# Patient Record
Sex: FEMALE | Employment: OTHER | URBAN - NONMETROPOLITAN AREA
[De-identification: names, ages, dates, MRNs, and addresses within clinical notes are randomized per-mention and may not be internally consistent; named-entity substitution may affect disease eponyms.]

---

## 2017-03-09 ENCOUNTER — OFFICE VISIT (OUTPATIENT)
Dept: FAMILY MEDICINE CLINIC | Age: 82
End: 2017-03-09

## 2017-03-09 VITALS
HEIGHT: 62 IN | WEIGHT: 124 LBS | HEART RATE: 62 BPM | SYSTOLIC BLOOD PRESSURE: 132 MMHG | DIASTOLIC BLOOD PRESSURE: 76 MMHG | BODY MASS INDEX: 22.82 KG/M2

## 2017-03-09 DIAGNOSIS — E78.5 ELEVATED FASTING LIPID PROFILE: ICD-10-CM

## 2017-03-09 DIAGNOSIS — F41.9 ANXIETY: Primary | ICD-10-CM

## 2017-03-09 DIAGNOSIS — N28.9 RENAL INSUFFICIENCY: ICD-10-CM

## 2017-03-09 DIAGNOSIS — Z78.0 MENOPAUSE: ICD-10-CM

## 2017-03-09 LAB
ANION GAP SERPL CALCULATED.3IONS-SCNC: 11 MMOL/L (ref 9–17)
BUN BLDV-MCNC: 12 MG/DL (ref 8–23)
BUN/CREAT BLD: 13 (ref 9–20)
CALCIUM SERPL-MCNC: 9.9 MG/DL (ref 8.6–10.4)
CHLORIDE BLD-SCNC: 107 MMOL/L (ref 98–107)
CHOLESTEROL/HDL RATIO: 3.7
CHOLESTEROL: 251 MG/DL
CO2: 28 MMOL/L (ref 20–31)
CREAT SERPL-MCNC: 0.96 MG/DL (ref 0.5–0.9)
GFR AFRICAN AMERICAN: >60 ML/MIN
GFR NON-AFRICAN AMERICAN: 56 ML/MIN
GFR SERPL CREATININE-BSD FRML MDRD: ABNORMAL ML/MIN/{1.73_M2}
GFR SERPL CREATININE-BSD FRML MDRD: ABNORMAL ML/MIN/{1.73_M2}
GLUCOSE BLD-MCNC: 102 MG/DL (ref 70–99)
HDLC SERPL-MCNC: 67 MG/DL
LDL CHOLESTEROL: 165 MG/DL (ref 0–130)
POTASSIUM SERPL-SCNC: 5.5 MMOL/L (ref 3.7–5.3)
SODIUM BLD-SCNC: 146 MMOL/L (ref 135–144)
TRIGL SERPL-MCNC: 96 MG/DL
VLDLC SERPL CALC-MCNC: 19 MG/DL (ref 1–30)

## 2017-03-09 PROCEDURE — 1123F ACP DISCUSS/DSCN MKR DOCD: CPT | Performed by: FAMILY MEDICINE

## 2017-03-09 PROCEDURE — 4040F PNEUMOC VAC/ADMIN/RCVD: CPT | Performed by: FAMILY MEDICINE

## 2017-03-09 PROCEDURE — G8484 FLU IMMUNIZE NO ADMIN: HCPCS | Performed by: FAMILY MEDICINE

## 2017-03-09 PROCEDURE — 99214 OFFICE O/P EST MOD 30 MIN: CPT | Performed by: FAMILY MEDICINE

## 2017-03-09 PROCEDURE — 80061 LIPID PANEL: CPT | Performed by: FAMILY MEDICINE

## 2017-03-09 PROCEDURE — 1090F PRES/ABSN URINE INCON ASSESS: CPT | Performed by: FAMILY MEDICINE

## 2017-03-09 PROCEDURE — G8427 DOCREV CUR MEDS BY ELIG CLIN: HCPCS | Performed by: FAMILY MEDICINE

## 2017-03-09 PROCEDURE — G8419 CALC BMI OUT NRM PARAM NOF/U: HCPCS | Performed by: FAMILY MEDICINE

## 2017-03-09 PROCEDURE — 1036F TOBACCO NON-USER: CPT | Performed by: FAMILY MEDICINE

## 2017-03-09 PROCEDURE — 80048 BASIC METABOLIC PNL TOTAL CA: CPT | Performed by: FAMILY MEDICINE

## 2017-03-09 PROCEDURE — 36415 COLL VENOUS BLD VENIPUNCTURE: CPT | Performed by: FAMILY MEDICINE

## 2017-03-09 PROCEDURE — G8399 PT W/DXA RESULTS DOCUMENT: HCPCS | Performed by: FAMILY MEDICINE

## 2017-03-09 ASSESSMENT — ENCOUNTER SYMPTOMS
RESPIRATORY NEGATIVE: 1
GASTROINTESTINAL NEGATIVE: 1
ALLERGIC/IMMUNOLOGIC NEGATIVE: 1

## 2017-07-21 ENCOUNTER — OFFICE VISIT (OUTPATIENT)
Dept: FAMILY MEDICINE CLINIC | Age: 82
End: 2017-07-21
Payer: MEDICARE

## 2017-07-21 VITALS
OXYGEN SATURATION: 96 % | BODY MASS INDEX: 21.16 KG/M2 | TEMPERATURE: 97.4 F | DIASTOLIC BLOOD PRESSURE: 80 MMHG | HEART RATE: 61 BPM | HEIGHT: 62 IN | SYSTOLIC BLOOD PRESSURE: 130 MMHG | WEIGHT: 115 LBS

## 2017-07-21 DIAGNOSIS — N28.9 RENAL INSUFFICIENCY: Primary | ICD-10-CM

## 2017-07-21 DIAGNOSIS — N26.1 BILATERAL RENAL ATROPHY: ICD-10-CM

## 2017-07-21 DIAGNOSIS — E78.5 HYPERLIPIDEMIA, UNSPECIFIED HYPERLIPIDEMIA TYPE: ICD-10-CM

## 2017-07-21 LAB
ANION GAP SERPL CALCULATED.3IONS-SCNC: 11 MMOL/L (ref 9–17)
BUN BLDV-MCNC: 13 MG/DL (ref 8–23)
BUN/CREAT BLD: 15 (ref 9–20)
CALCIUM SERPL-MCNC: 9.6 MG/DL (ref 8.6–10.4)
CHLORIDE BLD-SCNC: 103 MMOL/L (ref 98–107)
CHOLESTEROL/HDL RATIO: 4.2
CHOLESTEROL: 246 MG/DL
CO2: 28 MMOL/L (ref 20–31)
CREAT SERPL-MCNC: 0.87 MG/DL (ref 0.5–0.9)
GFR AFRICAN AMERICAN: >60 ML/MIN
GFR NON-AFRICAN AMERICAN: >60 ML/MIN
GFR SERPL CREATININE-BSD FRML MDRD: NORMAL ML/MIN/{1.73_M2}
GFR SERPL CREATININE-BSD FRML MDRD: NORMAL ML/MIN/{1.73_M2}
GLUCOSE BLD-MCNC: 97 MG/DL (ref 70–99)
HDLC SERPL-MCNC: 59 MG/DL
LDL CHOLESTEROL: 160 MG/DL (ref 0–130)
POTASSIUM SERPL-SCNC: 4.6 MMOL/L (ref 3.7–5.3)
SODIUM BLD-SCNC: 142 MMOL/L (ref 135–144)
TRIGL SERPL-MCNC: 133 MG/DL
VLDLC SERPL CALC-MCNC: ABNORMAL MG/DL (ref 1–30)

## 2017-07-21 PROCEDURE — 1123F ACP DISCUSS/DSCN MKR DOCD: CPT | Performed by: FAMILY MEDICINE

## 2017-07-21 PROCEDURE — 1090F PRES/ABSN URINE INCON ASSESS: CPT | Performed by: FAMILY MEDICINE

## 2017-07-21 PROCEDURE — 1036F TOBACCO NON-USER: CPT | Performed by: FAMILY MEDICINE

## 2017-07-21 PROCEDURE — 80048 BASIC METABOLIC PNL TOTAL CA: CPT | Performed by: FAMILY MEDICINE

## 2017-07-21 PROCEDURE — 80061 LIPID PANEL: CPT | Performed by: FAMILY MEDICINE

## 2017-07-21 PROCEDURE — 99213 OFFICE O/P EST LOW 20 MIN: CPT | Performed by: FAMILY MEDICINE

## 2017-07-21 PROCEDURE — G8420 CALC BMI NORM PARAMETERS: HCPCS | Performed by: FAMILY MEDICINE

## 2017-07-21 PROCEDURE — 36415 COLL VENOUS BLD VENIPUNCTURE: CPT | Performed by: FAMILY MEDICINE

## 2017-07-21 PROCEDURE — 4040F PNEUMOC VAC/ADMIN/RCVD: CPT | Performed by: FAMILY MEDICINE

## 2017-07-21 PROCEDURE — G8399 PT W/DXA RESULTS DOCUMENT: HCPCS | Performed by: FAMILY MEDICINE

## 2017-07-21 PROCEDURE — G8427 DOCREV CUR MEDS BY ELIG CLIN: HCPCS | Performed by: FAMILY MEDICINE

## 2017-10-09 ENCOUNTER — OFFICE VISIT (OUTPATIENT)
Dept: FAMILY MEDICINE CLINIC | Age: 82
End: 2017-10-09
Payer: MEDICARE

## 2017-10-09 ENCOUNTER — HOSPITAL ENCOUNTER (OUTPATIENT)
Dept: LAB | Age: 82
Setting detail: SPECIMEN
Discharge: HOME | End: 2017-10-09
Payer: MEDICARE

## 2017-10-09 ENCOUNTER — HOSPITAL ENCOUNTER (OUTPATIENT)
Age: 82
Setting detail: SPECIMEN
Discharge: HOME | End: 2017-10-09
Payer: MEDICARE

## 2017-10-09 VITALS
TEMPERATURE: 98.2 F | WEIGHT: 113 LBS | OXYGEN SATURATION: 90 % | DIASTOLIC BLOOD PRESSURE: 82 MMHG | BODY MASS INDEX: 20.84 KG/M2 | HEART RATE: 68 BPM | SYSTOLIC BLOOD PRESSURE: 144 MMHG

## 2017-10-09 DIAGNOSIS — R53.83 FATIGUE, UNSPECIFIED TYPE: ICD-10-CM

## 2017-10-09 DIAGNOSIS — R06.00 DYSPNEA, UNSPECIFIED TYPE: Primary | ICD-10-CM

## 2017-10-09 LAB
ABSOLUTE EOS #: 0.1 K/UL (ref 0–0.4)
ABSOLUTE LYMPH #: 2.4 K/UL (ref 1–4.8)
ABSOLUTE MONO #: 0.6 K/UL (ref 0.1–1.2)
ALBUMIN SERPL-MCNC: 4.3 G/DL (ref 3.5–5.2)
ALBUMIN/GLOBULIN RATIO: 1.5 (ref 1–2.5)
ALP BLD-CCNC: 60 U/L (ref 35–104)
ALT SERPL-CCNC: 11 U/L (ref 5–33)
ANION GAP SERPL CALCULATED.3IONS-SCNC: 14 MMOL/L (ref 9–17)
AST SERPL-CCNC: 22 U/L
BASOPHILS # BLD: 0 % (ref 0–1)
BASOPHILS ABSOLUTE: 0 K/UL (ref 0–0.2)
BILIRUB SERPL-MCNC: 0.33 MG/DL (ref 0.3–1.2)
BUN BLDV-MCNC: 17 MG/DL (ref 8–23)
BUN/CREAT BLD: 18 (ref 9–20)
CALCIUM SERPL-MCNC: 10 MG/DL (ref 8.6–10.4)
CHLORIDE BLD-SCNC: 101 MMOL/L (ref 98–107)
CO2: 29 MMOL/L (ref 20–31)
CREAT SERPL-MCNC: 0.97 MG/DL (ref 0.5–0.9)
DIFFERENTIAL TYPE: ABNORMAL
EOSINOPHILS RELATIVE PERCENT: 2 % (ref 1–7)
GFR AFRICAN AMERICAN: >60 ML/MIN
GFR NON-AFRICAN AMERICAN: 55 ML/MIN
GFR SERPL CREATININE-BSD FRML MDRD: ABNORMAL ML/MIN/{1.73_M2}
GFR SERPL CREATININE-BSD FRML MDRD: ABNORMAL ML/MIN/{1.73_M2}
GLUCOSE BLD-MCNC: 94 MG/DL (ref 70–99)
HCT VFR BLD CALC: 35.6 % (ref 36–46)
HEMOGLOBIN: 11.5 G/DL (ref 12–16)
LYMPHOCYTES # BLD: 39 % (ref 16–46)
MCH RBC QN AUTO: 30.1 PG (ref 26–34)
MCHC RBC AUTO-ENTMCNC: 32.3 G/DL (ref 31–37)
MCV RBC AUTO: 93.2 FL (ref 80–100)
MONOCYTES # BLD: 10 % (ref 4–11)
PDW BLD-RTO: 13.7 % (ref 11–14.5)
PLATELET # BLD: 199 K/UL (ref 140–450)
PLATELET ESTIMATE: ABNORMAL
PMV BLD AUTO: 8.9 FL (ref 6–12)
POTASSIUM SERPL-SCNC: 4 MMOL/L (ref 3.7–5.3)
RBC # BLD: 3.82 M/UL (ref 4–5.2)
RBC # BLD: ABNORMAL 10*6/UL
SEG NEUTROPHILS: 49 % (ref 43–77)
SEGMENTED NEUTROPHILS ABSOLUTE COUNT: 3 K/UL (ref 1.8–7.7)
SODIUM BLD-SCNC: 144 MMOL/L (ref 135–144)
THYROXINE, FREE: 1.18 NG/DL (ref 0.93–1.7)
TOTAL PROTEIN: 7.1 G/DL (ref 6.4–8.3)
TSH SERPL DL<=0.05 MIU/L-ACNC: 3.72 MIU/L (ref 0.3–5)
WBC # BLD: 6.1 K/UL (ref 3.5–11)
WBC # BLD: ABNORMAL 10*3/UL

## 2017-10-09 PROCEDURE — G8399 PT W/DXA RESULTS DOCUMENT: HCPCS | Performed by: FAMILY MEDICINE

## 2017-10-09 PROCEDURE — G8420 CALC BMI NORM PARAMETERS: HCPCS | Performed by: FAMILY MEDICINE

## 2017-10-09 PROCEDURE — 84443 ASSAY THYROID STIM HORMONE: CPT

## 2017-10-09 PROCEDURE — G8427 DOCREV CUR MEDS BY ELIG CLIN: HCPCS | Performed by: FAMILY MEDICINE

## 2017-10-09 PROCEDURE — 99214 OFFICE O/P EST MOD 30 MIN: CPT | Performed by: FAMILY MEDICINE

## 2017-10-09 PROCEDURE — 1036F TOBACCO NON-USER: CPT | Performed by: FAMILY MEDICINE

## 2017-10-09 PROCEDURE — 1090F PRES/ABSN URINE INCON ASSESS: CPT | Performed by: FAMILY MEDICINE

## 2017-10-09 PROCEDURE — 93000 ELECTROCARDIOGRAM COMPLETE: CPT | Performed by: INTERNAL MEDICINE

## 2017-10-09 PROCEDURE — G8484 FLU IMMUNIZE NO ADMIN: HCPCS | Performed by: FAMILY MEDICINE

## 2017-10-09 PROCEDURE — 36415 COLL VENOUS BLD VENIPUNCTURE: CPT

## 2017-10-09 PROCEDURE — 1123F ACP DISCUSS/DSCN MKR DOCD: CPT | Performed by: FAMILY MEDICINE

## 2017-10-09 PROCEDURE — 80053 COMPREHEN METABOLIC PANEL: CPT

## 2017-10-09 PROCEDURE — 4040F PNEUMOC VAC/ADMIN/RCVD: CPT | Performed by: FAMILY MEDICINE

## 2017-10-09 PROCEDURE — 84439 ASSAY OF FREE THYROXINE: CPT

## 2017-10-09 PROCEDURE — 85025 COMPLETE CBC W/AUTO DIFF WBC: CPT

## 2017-10-09 ASSESSMENT — ENCOUNTER SYMPTOMS
ALLERGIC/IMMUNOLOGIC NEGATIVE: 1
SHORTNESS OF BREATH: 1
EYES NEGATIVE: 1
COUGH: 0
GASTROINTESTINAL NEGATIVE: 1

## 2017-10-09 NOTE — PROGRESS NOTES
Subjective:      Patient ID: Maria Esther Fallon is a 80 y.o. female. For the past 4 weeks has been feeling tired and feels weak. In the past few days has noticed she feels short of breath with minimal activity. No problems with nighttime dyspnea. No complaints of chest pain. No problems with any neurological changes except she notices her lower right leg to feel different. Is feeling anxious. Has changed her diet because of elevated potassium and has lost weight. Review of Systems   Constitutional: Positive for fatigue. HENT: Negative. Eyes: Negative. Respiratory: Positive for shortness of breath. Negative for cough. Cardiovascular: Negative. Gastrointestinal: Negative. Endocrine: Negative. Genitourinary: Negative. Musculoskeletal: Negative. Skin: Negative. Allergic/Immunologic: Negative. Neurological: Positive for weakness. Hematological: Negative. Psychiatric/Behavioral: The patient is nervous/anxious. Objective:   Physical Exam   Constitutional: She is oriented to person, place, and time. She appears well-developed and well-nourished. HENT:   Head: Normocephalic and atraumatic. Right Ear: External ear normal.   Left Ear: External ear normal.   Nose: Nose normal.   Mouth/Throat: Oropharynx is clear and moist. No oropharyngeal exudate. Eyes: Conjunctivae and EOM are normal. Pupils are equal, round, and reactive to light. Neck: No tracheal deviation present. No thyromegaly present. Cardiovascular: Normal rate, regular rhythm and normal heart sounds. No murmur heard. Pulmonary/Chest: Effort normal and breath sounds normal.   Abdominal: Soft. Bowel sounds are normal.   Musculoskeletal: She exhibits no edema. Lymphadenopathy:     She has no cervical adenopathy. Neurological: She is alert and oriented to person, place, and time.    Finger to nose intact  Able to walk on her toes and heels  Stand and sit without assistance  No obvious neurological changes     Skin: Skin is warm and dry. Psychiatric: She has a normal mood and affect. Nursing note and vitals reviewed. ekg showed a change in V2    Assessment:      Dyspnea  Fatigue  Weight loss       Plan: Will check a chest x ray. Has follow up with cardiology in the near future. Her blood pressure is up today. No evidence of fluid overload at this time. She does have anxiety but unsure if this is contributing to her problem or not. The blood pressure up today but typically it is in good control will monitor and if elevated next week will start norvasc 2.5 mg a day (has history of elevated potassium and at this time will avoid an arb or ace). .  Labs per orders. Follow up 1 week.

## 2017-10-10 ENCOUNTER — TELEPHONE (OUTPATIENT)
Dept: FAMILY MEDICINE CLINIC | Age: 82
End: 2017-10-10

## 2017-10-10 NOTE — TELEPHONE ENCOUNTER
Patient called to advise that chest xray back /ok per dr Iraj Dotson and he will discuss more at f/u appt /patient aware

## 2017-10-11 DIAGNOSIS — R06.00 DYSPNEA, UNSPECIFIED TYPE: ICD-10-CM

## 2017-10-16 ENCOUNTER — OFFICE VISIT (OUTPATIENT)
Dept: FAMILY MEDICINE CLINIC | Age: 82
End: 2017-10-16
Payer: MEDICARE

## 2017-10-16 VITALS
TEMPERATURE: 98.1 F | HEIGHT: 63 IN | DIASTOLIC BLOOD PRESSURE: 68 MMHG | OXYGEN SATURATION: 100 % | WEIGHT: 114.4 LBS | BODY MASS INDEX: 20.27 KG/M2 | HEART RATE: 57 BPM | SYSTOLIC BLOOD PRESSURE: 110 MMHG

## 2017-10-16 DIAGNOSIS — F41.9 ANXIETY: Primary | ICD-10-CM

## 2017-10-16 PROCEDURE — G8420 CALC BMI NORM PARAMETERS: HCPCS | Performed by: FAMILY MEDICINE

## 2017-10-16 PROCEDURE — 1090F PRES/ABSN URINE INCON ASSESS: CPT | Performed by: FAMILY MEDICINE

## 2017-10-16 PROCEDURE — 99213 OFFICE O/P EST LOW 20 MIN: CPT | Performed by: FAMILY MEDICINE

## 2017-10-16 PROCEDURE — 4040F PNEUMOC VAC/ADMIN/RCVD: CPT | Performed by: FAMILY MEDICINE

## 2017-10-16 PROCEDURE — G8399 PT W/DXA RESULTS DOCUMENT: HCPCS | Performed by: FAMILY MEDICINE

## 2017-10-16 PROCEDURE — G8427 DOCREV CUR MEDS BY ELIG CLIN: HCPCS | Performed by: FAMILY MEDICINE

## 2017-10-16 PROCEDURE — G8484 FLU IMMUNIZE NO ADMIN: HCPCS | Performed by: FAMILY MEDICINE

## 2017-10-16 PROCEDURE — 1036F TOBACCO NON-USER: CPT | Performed by: FAMILY MEDICINE

## 2017-10-16 PROCEDURE — 1123F ACP DISCUSS/DSCN MKR DOCD: CPT | Performed by: FAMILY MEDICINE

## 2017-10-16 ASSESSMENT — ENCOUNTER SYMPTOMS
GASTROINTESTINAL NEGATIVE: 1
SHORTNESS OF BREATH: 1

## 2017-10-16 NOTE — PROGRESS NOTES
Subjective:      Patient ID: Fabian Chacon is a 80 y.o. female. Her labs showed a slight decrease in her hgb of 11.5. Renal function is stable. Did see the cardiologist and he did not think she had a significant cardiac dysfunction. Today she continues with the sense she can not get in good breath. She is otherwise feeling ok but she continues with the sense of internal nervousness. Her x ray showed hyperinflation of lungs otherwise no acute changes. Her walking is improving and her fatigue is improving with time. Review of Systems   HENT: Negative. Eyes: Positive for visual disturbance. Respiratory: Positive for shortness of breath. Cardiovascular: Negative. Gastrointestinal: Negative. Objective:   Physical Exam   Constitutional: She is oriented to person, place, and time. She appears well-developed. HENT:   Mouth/Throat: Oropharynx is clear and moist.   Eyes: Conjunctivae are normal. Pupils are equal, round, and reactive to light. Neck: No tracheal deviation present. No thyromegaly present. Cardiovascular: Normal rate. Pulmonary/Chest: Effort normal and breath sounds normal.   Musculoskeletal: She exhibits no edema. Lymphadenopathy:     She has no cervical adenopathy. Neurological: She is alert and oriented to person, place, and time. Skin: Skin is warm. Psychiatric: She has a normal mood and affect. Nursing note and vitals reviewed. Assessment:      Dyspnea  Anxiety  Renal insufficiency   Macular degeneration wet      Plan: At this time she does not want to restart any med for her anxiety. Doubt the lung changes caused an acute change in her breathing. Has follow up with nephrologist in near future. .  She refuses flu shot at this time but will get in future. Follow up prn and per routine health care. Recommend pneumovax she is refusing.

## 2018-02-05 ENCOUNTER — OFFICE VISIT (OUTPATIENT)
Dept: FAMILY MEDICINE CLINIC | Age: 83
End: 2018-02-05
Payer: MEDICARE

## 2018-02-05 ENCOUNTER — HOSPITAL ENCOUNTER (OUTPATIENT)
Dept: LAB | Age: 83
Setting detail: SPECIMEN
Discharge: HOME | End: 2018-02-05
Payer: MEDICARE

## 2018-02-05 VITALS
WEIGHT: 115 LBS | BODY MASS INDEX: 20.37 KG/M2 | OXYGEN SATURATION: 98 % | SYSTOLIC BLOOD PRESSURE: 130 MMHG | HEART RATE: 56 BPM | DIASTOLIC BLOOD PRESSURE: 74 MMHG

## 2018-02-05 DIAGNOSIS — N28.9 RENAL INSUFFICIENCY: Primary | ICD-10-CM

## 2018-02-05 DIAGNOSIS — N28.9 RENAL INSUFFICIENCY: ICD-10-CM

## 2018-02-05 DIAGNOSIS — Z23 NEED FOR 23-POLYVALENT PNEUMOCOCCAL POLYSACCHARIDE VACCINE: ICD-10-CM

## 2018-02-05 LAB
ABSOLUTE EOS #: 0.11 K/UL (ref 0–0.4)
ABSOLUTE IMMATURE GRANULOCYTE: ABNORMAL K/UL (ref 0–0.3)
ABSOLUTE LYMPH #: 1.94 K/UL (ref 1–4.8)
ABSOLUTE MONO #: 0.56 K/UL (ref 0.1–1.2)
ANION GAP SERPL CALCULATED.3IONS-SCNC: 12 MMOL/L (ref 9–17)
BASOPHILS # BLD: 1 % (ref 0–1)
BASOPHILS ABSOLUTE: 0.03 K/UL (ref 0–0.2)
BUN BLDV-MCNC: 17 MG/DL (ref 8–23)
BUN/CREAT BLD: 17 (ref 9–20)
CALCIUM SERPL-MCNC: 9.9 MG/DL (ref 8.6–10.4)
CHLORIDE BLD-SCNC: 102 MMOL/L (ref 98–107)
CO2: 28 MMOL/L (ref 20–31)
CREAT SERPL-MCNC: 0.98 MG/DL (ref 0.5–0.9)
DIFFERENTIAL TYPE: ABNORMAL
EOSINOPHILS RELATIVE PERCENT: 2 % (ref 1–7)
GFR AFRICAN AMERICAN: >60 ML/MIN
GFR NON-AFRICAN AMERICAN: 54 ML/MIN
GFR SERPL CREATININE-BSD FRML MDRD: ABNORMAL ML/MIN/{1.73_M2}
GFR SERPL CREATININE-BSD FRML MDRD: ABNORMAL ML/MIN/{1.73_M2}
GLUCOSE BLD-MCNC: 97 MG/DL (ref 70–99)
HCT VFR BLD CALC: 35.6 % (ref 36–46)
HEMOGLOBIN: 11.8 G/DL (ref 12–16)
IMMATURE GRANULOCYTES: ABNORMAL %
LYMPHOCYTES # BLD: 33 % (ref 16–46)
MCH RBC QN AUTO: 30.4 PG (ref 26–34)
MCHC RBC AUTO-ENTMCNC: 33.2 G/DL (ref 31–37)
MCV RBC AUTO: 91.6 FL (ref 80–100)
MONOCYTES # BLD: 10 % (ref 4–11)
NRBC AUTOMATED: ABNORMAL PER 100 WBC
PDW BLD-RTO: 13 % (ref 11–14.5)
PLATELET # BLD: 201 K/UL (ref 140–450)
PLATELET ESTIMATE: ABNORMAL
PMV BLD AUTO: 8.4 FL (ref 6–12)
POTASSIUM SERPL-SCNC: 4.5 MMOL/L (ref 3.7–5.3)
RBC # BLD: 3.89 M/UL (ref 4–5.2)
RBC # BLD: ABNORMAL 10*6/UL
SEG NEUTROPHILS: 54 % (ref 43–77)
SEGMENTED NEUTROPHILS ABSOLUTE COUNT: 3.24 K/UL (ref 1.8–7.7)
SODIUM BLD-SCNC: 142 MMOL/L (ref 135–144)
WBC # BLD: 5.8 K/UL (ref 3.5–11)
WBC # BLD: ABNORMAL 10*3/UL

## 2018-02-05 PROCEDURE — 80048 BASIC METABOLIC PNL TOTAL CA: CPT

## 2018-02-05 PROCEDURE — G8420 CALC BMI NORM PARAMETERS: HCPCS | Performed by: FAMILY MEDICINE

## 2018-02-05 PROCEDURE — G0009 ADMIN PNEUMOCOCCAL VACCINE: HCPCS | Performed by: FAMILY MEDICINE

## 2018-02-05 PROCEDURE — 1090F PRES/ABSN URINE INCON ASSESS: CPT | Performed by: FAMILY MEDICINE

## 2018-02-05 PROCEDURE — 1036F TOBACCO NON-USER: CPT | Performed by: FAMILY MEDICINE

## 2018-02-05 PROCEDURE — 90732 PPSV23 VACC 2 YRS+ SUBQ/IM: CPT | Performed by: FAMILY MEDICINE

## 2018-02-05 PROCEDURE — 4040F PNEUMOC VAC/ADMIN/RCVD: CPT | Performed by: FAMILY MEDICINE

## 2018-02-05 PROCEDURE — G8484 FLU IMMUNIZE NO ADMIN: HCPCS | Performed by: FAMILY MEDICINE

## 2018-02-05 PROCEDURE — 85025 COMPLETE CBC W/AUTO DIFF WBC: CPT

## 2018-02-05 PROCEDURE — G8399 PT W/DXA RESULTS DOCUMENT: HCPCS | Performed by: FAMILY MEDICINE

## 2018-02-05 PROCEDURE — 1123F ACP DISCUSS/DSCN MKR DOCD: CPT | Performed by: FAMILY MEDICINE

## 2018-02-05 PROCEDURE — 36415 COLL VENOUS BLD VENIPUNCTURE: CPT

## 2018-02-05 PROCEDURE — 99213 OFFICE O/P EST LOW 20 MIN: CPT | Performed by: FAMILY MEDICINE

## 2018-02-05 PROCEDURE — G8427 DOCREV CUR MEDS BY ELIG CLIN: HCPCS | Performed by: FAMILY MEDICINE

## 2018-02-05 RX ORDER — RIBOFLAVIN (VITAMIN B2) 100 MG
100 TABLET ORAL DAILY
COMMUNITY

## 2018-02-05 ASSESSMENT — PATIENT HEALTH QUESTIONNAIRE - PHQ9
1. LITTLE INTEREST OR PLEASURE IN DOING THINGS: 0
2. FEELING DOWN, DEPRESSED OR HOPELESS: 0
SUM OF ALL RESPONSES TO PHQ9 QUESTIONS 1 & 2: 0
SUM OF ALL RESPONSES TO PHQ QUESTIONS 1-9: 0

## 2018-02-05 ASSESSMENT — ENCOUNTER SYMPTOMS
GASTROINTESTINAL NEGATIVE: 1
RESPIRATORY NEGATIVE: 1

## 2018-02-05 NOTE — PATIENT INSTRUCTIONS
use grippers that can be worn over your shoes in bad weather. · Be extra careful if weather is bad. Walk on the grass when the sidewalks are slick. If you live in a place that gets snow and ice in the winter, sprinkle salt on slippery stairs and sidewalks. · Be careful getting on or off buses and trains or getting in and out of cars. If handrails are available, use them. · Be careful when you cross the street. Look for crosswalks or places where curb cuts or ramps are present. · Try not to hurry, especially if you are carrying something. · Be cautious in parking lots or garages. There may be curbs or changes in pavement, or the height of the pavement may vary. · Make sure to wear the correct eyeglasses, if you need them. Reading glasses or bifocals can make it harder to see hazards that might be in your way. · If you are walking outdoors for exercise, try to:  ¨ Walk in well-lighted, well-maintained areas. These include high school or college tracks, shopping malls, and public spaces. ¨ Walk with a partner. ¨ Watch out for cracked sidewalks, curbs, changes in the height of the pavement, exposed tree roots, and debris such as fallen leaves or branches. Where can you learn more? Go to https://Cybrata Networkspepiceweb.healthSpotlight At Night. org and sign in to your [employer] account. Enter N089 in the [facility] box to learn more about \"Preventing Outdoor Falls: Care Instructions. \"     If you do not have an account, please click on the \"Sign Up Now\" link. Current as of: May 12, 2017  Content Version: 11.5  © 9920-6269 [employer], [employer]. Care instructions adapted under license by [facility] ([facility]). If you have questions about a medical condition or this instruction, always ask your healthcare professional. [place] [address] any warranty or liability for your use of this information.

## 2018-02-05 NOTE — PROGRESS NOTES
Subjective:      Patient ID: Livia Polo is a 80 y.o. female. Has had some mild problems with renal insufficiency. She currently has not problems with chest pain or dyspnea. No urine type problems. Has been feeling well. She has been eating well and denies any weakness. Last hgb was 11.5 and her GFR was 55. Review of Systems   Constitutional: Negative. HENT: Negative. Respiratory: Negative. Cardiovascular: Negative. Gastrointestinal: Negative. Genitourinary: Negative. Musculoskeletal: Negative. Objective:   Physical Exam   Constitutional: She is oriented to person, place, and time. She appears well-developed and well-nourished. HENT:   Head: Normocephalic and atraumatic. Eyes: Conjunctivae are normal. Pupils are equal, round, and reactive to light. Neck: No tracheal deviation present. No thyromegaly present. Cardiovascular: Normal rate and regular rhythm. Pulmonary/Chest: Effort normal and breath sounds normal.   Abdominal: Soft. There is no tenderness. Musculoskeletal: She exhibits no edema. Lymphadenopathy:     She has no cervical adenopathy. Neurological: She is alert and oriented to person, place, and time. Skin: Skin is warm and dry. Psychiatric: She has a normal mood and affect. Nursing note and vitals reviewed. Assessment:      Renal insufficieny   MVP      Plan:      Labs per orders. To continue atenolol for her mvp. Pneumovax today. Had flu shot at [facility].   Follow up prn and per routine health care

## 2018-11-01 LAB
ALBUMIN: 3.3 G/DL (ref 3.5–5)
BUN BLDV-MCNC: 13 MG/DL (ref 7–18)
CALCIUM SERPL-MCNC: 8.8 MG/DL (ref 8.5–10.1)
CHLORIDE BLD-SCNC: 108 MMOL/L (ref 97–107)
CO2: 28 MMOL/L (ref 21–32)
CREAT SERPL-MCNC: 1.09 MG/DL (ref 0.55–1.02)
CREATININE URINE: 16.8 MG/DL
GFR CALCULATED: 51
GLUCOSE: 92 MG/DL (ref 70–99)
HEMOGLOBIN: 11.9 G/DL (ref 11.7–14.9)
MAGNESIUM: 2.1 MG/DL (ref 1.8–2.4)
PHOSPHORUS: 3.5 MG/DL (ref 2.5–4.9)
POTASSIUM SERPL-SCNC: 4.2 MMOL/L (ref 3.5–5.1)
SODIUM BLD-SCNC: 143 MMOL/L (ref 136–145)
TOTAL PROTEIN, URINE: < 5 MG/DL

## 2018-11-12 ENCOUNTER — OFFICE VISIT (OUTPATIENT)
Dept: FAMILY MEDICINE CLINIC | Age: 83
End: 2018-11-12
Payer: MEDICARE

## 2018-11-12 VITALS
HEART RATE: 68 BPM | BODY MASS INDEX: 21.26 KG/M2 | RESPIRATION RATE: 16 BRPM | HEIGHT: 63 IN | TEMPERATURE: 97.7 F | WEIGHT: 120 LBS | DIASTOLIC BLOOD PRESSURE: 62 MMHG | SYSTOLIC BLOOD PRESSURE: 124 MMHG | OXYGEN SATURATION: 99 %

## 2018-11-12 DIAGNOSIS — M25.461 PAIN AND SWELLING OF RIGHT KNEE: ICD-10-CM

## 2018-11-12 DIAGNOSIS — Z91.81 AT HIGH RISK FOR FALLS: ICD-10-CM

## 2018-11-12 DIAGNOSIS — L03.031 PARONYCHIA OF GREAT TOE, RIGHT: Primary | ICD-10-CM
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
## 2018-11-12 DIAGNOSIS — M25.561 PAIN AND SWELLING OF RIGHT KNEE: ICD-10-CM

## 2018-11-12 PROCEDURE — G8427 DOCREV CUR MEDS BY ELIG CLIN: HCPCS | Performed by: NURSE PRACTITIONER

## 2018-11-12 PROCEDURE — 1123F ACP DISCUSS/DSCN MKR DOCD: CPT | Performed by: NURSE PRACTITIONER

## 2018-11-12 PROCEDURE — 4040F PNEUMOC VAC/ADMIN/RCVD: CPT | Performed by: NURSE PRACTITIONER

## 2018-11-12 PROCEDURE — G8399 PT W/DXA RESULTS DOCUMENT: HCPCS | Performed by: NURSE PRACTITIONER

## 2018-11-12 PROCEDURE — 1090F PRES/ABSN URINE INCON ASSESS: CPT | Performed by: NURSE PRACTITIONER

## 2018-11-12 PROCEDURE — G8482 FLU IMMUNIZE ORDER/ADMIN: HCPCS | Performed by: NURSE PRACTITIONER

## 2018-11-12 PROCEDURE — G8420 CALC BMI NORM PARAMETERS: HCPCS | Performed by: NURSE PRACTITIONER

## 2018-11-12 PROCEDURE — 1036F TOBACCO NON-USER: CPT | Performed by: NURSE PRACTITIONER

## 2018-11-12 PROCEDURE — 99214 OFFICE O/P EST MOD 30 MIN: CPT | Performed by: NURSE PRACTITIONER

## 2018-11-12 PROCEDURE — 1101F PT FALLS ASSESS-DOCD LE1/YR: CPT | Performed by: NURSE PRACTITIONER

## 2018-11-12 RX ORDER — CEPHALEXIN 500 MG/1
500 CAPSULE ORAL 3 TIMES DAILY
Qty: 21 CAPSULE | Refills: 0 | Status: SHIPPED | OUTPATIENT
Start: 2018-11-12 | End: 2019-07-01 | Stop reason: ALTCHOICE

## 2018-11-12 NOTE — PATIENT INSTRUCTIONS
doctor if you are having problems. It's also a good idea to know your test results and keep a list of the medicines you take. How can you care for yourself at home? · If your doctor told you how to care for your infected nail, follow the doctor's instructions. If you did not get instructions, follow this general advice:  ? Wash the area with clean water 2 times a day. Don't use hydrogen peroxide or alcohol, which can slow healing. ? You may cover the area with a thin layer of petroleum jelly, such as Vaseline, and a nonstick bandage. ? Apply more petroleum jelly and replace the bandage as needed. · If your doctor prescribed antibiotics, take them as directed. Do not stop taking them just because you feel better. You need to take the full course of antibiotics. · Take an over-the-counter pain medicine, such as acetaminophen (Tylenol), ibuprofen (Advil, Motrin), or naproxen (Aleve). Read and follow all instructions on the label. · Do not take two or more pain medicines at the same time unless the doctor told you to. Many pain medicines have acetaminophen, which is Tylenol. Too much acetaminophen (Tylenol) can be harmful. · Prop up the toe or finger so that it is higher than the level of your heart. This will help with pain and swelling. · Apply heat. Put a warm water bottle, heating pad set on low, or warm cloth on your finger or toe. Do not go to sleep with a heating pad on your skin. · Soak the area in warm water twice a day for 15 minutes each time. After soaking, dry the area well and apply a thin layer of petroleum jelly, such as Vaseline. Put on a new bandage. When should you call for help? Call your doctor now or seek immediate medical care if:    · You have signs of new or worsening infection, such as:  ? Increased pain, swelling, warmth, or redness. ? Red streaks leading from the infected skin. ? Pus draining from the area.   ? A fever.    Watch closely for changes in your health, and be sure to contact your doctor if:    · You do not get better as expected. Where can you learn more? Go to https://chpepiceweb.GeoGRAFI. org and sign in to your [employer] account. Enter [id number] [id number] [phone] in the [facility] box to learn more about \"Paronychia: Care Instructions. \"     If you do not have an account, please click on the \"Sign Up Now\" link. Current as of: April 18, 2018  Content Version: 11.8  © 8223-8939 [employer]. Care instructions adapted under license by [facility] ([facility]). If you have questions about a medical condition or this instruction, always ask your healthcare professional. [place] [address] any warranty or liability for your use of this information. Patient Education        Knee Pain or Injury: Care Instructions  Your Care Instructions    Injuries are a common cause of knee problems. Sudden (acute) injuries may be caused by a direct blow to the knee. They can also be caused by abnormal twisting, bending, or falling on the knee. Pain, bruising, or swelling may be severe, and may start within minutes of the injury. Overuse is another cause of knee pain. Other causes are climbing stairs, kneeling, and other activities that use the knee. Everyday wear and tear, especially as you get older, also can cause knee pain. Rest, along with home treatment, often relieves pain and allows your knee to heal. If you have a serious knee injury, you may need tests and treatment. Follow-up care is a key part of your treatment and safety. Be sure to make and go to all appointments, and call your doctor if you are having problems. It's also a good idea to know your test results and keep a list of the medicines you take. How can you care for yourself at home? · Be safe with medicines. Read and follow all instructions on the label. ? If the doctor gave you a prescription medicine for pain, take it as prescribed.   ? If you are not taking a prescription pain medicine, ask your doctor if you can take an over-the-counter medicine. · Rest and protect your knee. Take a break from any activity that may cause pain. · Put ice or a cold pack on your knee for 10 to 20 minutes at a time. Put a thin cloth between the ice and your skin. · Prop up a sore knee on a pillow when you ice it or anytime you sit or lie down for the next 3 days. Try to keep it above the level of your heart. This will help reduce swelling. · If your knee is not swollen, you can put moist heat, a heating pad, or a warm cloth on your knee. · If your doctor recommends an elastic bandage, sleeve, or other type of support for your knee, wear it as directed. · Follow your doctor's instructions about how much weight you can put on your leg. Use a cane, crutches, or a walker as instructed. · Follow your doctor's instructions about activity during your healing process. If you can do mild exercise, slowly increase your activity. · Reach and stay at a healthy weight. Extra weight can strain the joints, especially the knees and hips, and make the pain worse. Losing even a few pounds may help. When should you call for help? Call 911 anytime you think you may need emergency care. For example, call if:    · You have symptoms of a blood clot in your lung (called a pulmonary embolism). These may include:  ? Sudden chest pain. ? Trouble breathing. ? Coughing up blood.    Call your doctor now or seek immediate medical care if:    · You have severe or increasing pain.     · Your leg or foot turns cold or changes color.     · You cannot stand or put weight on your knee.     · Your knee looks twisted or bent out of shape.     · You cannot move your knee.     · You have signs of infection, such as:  ? Increased pain, swelling, warmth, or redness. ? Red streaks leading from the knee. ? Pus draining from a place on your knee.   ? A fever.     · You have signs of a blood clot in your leg (called a deep vein thrombosis), such as:  ? Pain in

## 2018-12-05 ENCOUNTER — OFFICE VISIT (OUTPATIENT)
Dept: FAMILY MEDICINE CLINIC | Age: 83
End: 2018-12-05
Payer: MEDICARE

## 2018-12-05 VITALS
BODY MASS INDEX: 21.26 KG/M2 | WEIGHT: 120 LBS | DIASTOLIC BLOOD PRESSURE: 62 MMHG | TEMPERATURE: 98.2 F | HEART RATE: 68 BPM | OXYGEN SATURATION: 98 % | RESPIRATION RATE: 16 BRPM | SYSTOLIC BLOOD PRESSURE: 118 MMHG

## 2018-12-05 DIAGNOSIS — G89.29 CHRONIC PAIN OF RIGHT KNEE: Primary | ICD-10-CM

## 2018-12-05 DIAGNOSIS — M25.561 CHRONIC PAIN OF RIGHT KNEE: Primary | ICD-10-CM

## 2018-12-05 PROCEDURE — G8399 PT W/DXA RESULTS DOCUMENT: HCPCS | Performed by: NURSE PRACTITIONER

## 2018-12-05 PROCEDURE — 4040F PNEUMOC VAC/ADMIN/RCVD: CPT | Performed by: NURSE PRACTITIONER

## 2018-12-05 PROCEDURE — 1036F TOBACCO NON-USER: CPT | Performed by: NURSE PRACTITIONER

## 2018-12-05 PROCEDURE — 99213 OFFICE O/P EST LOW 20 MIN: CPT | Performed by: NURSE PRACTITIONER

## 2018-12-05 PROCEDURE — G8420 CALC BMI NORM PARAMETERS: HCPCS | Performed by: NURSE PRACTITIONER

## 2018-12-05 PROCEDURE — 1101F PT FALLS ASSESS-DOCD LE1/YR: CPT | Performed by: NURSE PRACTITIONER

## 2018-12-05 PROCEDURE — G8427 DOCREV CUR MEDS BY ELIG CLIN: HCPCS | Performed by: NURSE PRACTITIONER

## 2018-12-05 PROCEDURE — 1090F PRES/ABSN URINE INCON ASSESS: CPT | Performed by: NURSE PRACTITIONER

## 2018-12-05 PROCEDURE — G8482 FLU IMMUNIZE ORDER/ADMIN: HCPCS | Performed by: NURSE PRACTITIONER

## 2018-12-05 PROCEDURE — 1123F ACP DISCUSS/DSCN MKR DOCD: CPT | Performed by: NURSE PRACTITIONER

## 2018-12-05 ASSESSMENT — ENCOUNTER SYMPTOMS
COUGH: 0
COLOR CHANGE: 0

## 2018-12-05 NOTE — PATIENT INSTRUCTIONS
Patient Education     Take knee wrap off at night to sleep. Remove knee sleeve when swelling becomes significant. Follow up  as needed. Knee Pain or Injury: Care Instructions  Your Care Instructions    Injuries are a common cause of knee problems. Sudden (acute) injuries may be caused by a direct blow to the knee. They can also be caused by abnormal twisting, bending, or falling on the knee. Pain, bruising, or swelling may be severe, and may start within minutes of the injury. Overuse is another cause of knee pain. Other causes are climbing stairs, kneeling, and other activities that use the knee. Everyday wear and tear, especially as you get older, also can cause knee pain. Rest, along with home treatment, often relieves pain and allows your knee to heal. If you have a serious knee injury, you may need tests and treatment. Follow-up care is a key part of your treatment and safety. Be sure to make and go to all appointments, and call your doctor if you are having problems. It's also a good idea to know your test results and keep a list of the medicines you take. How can you care for yourself at home? · Be safe with medicines. Read and follow all instructions on the label. ? If the doctor gave you a prescription medicine for pain, take it as prescribed. ? If you are not taking a prescription pain medicine, ask your doctor if you can take an over-the-counter medicine. · Rest and protect your knee. Take a break from any activity that may cause pain. · Put ice or a cold pack on your knee for 10 to 20 minutes at a time. Put a thin cloth between the ice and your skin. · Prop up a sore knee on a pillow when you ice it or anytime you sit or lie down for the next 3 days. Try to keep it above the level of your heart. This will help reduce swelling. · If your knee is not swollen, you can put moist heat, a heating pad, or a warm cloth on your knee.   · If your doctor recommends an elastic bandage, sleeve, or

## 2018-12-05 NOTE — PROGRESS NOTES
[address] [address] [address] [facility], APRN-CNP  [address]  Phone:  [phone]  Fax:  [phone]  Wilian Martin is a 80 y.o. female who presents today for her medical conditions/complaints as noted below. Gisselle Monte c/o of Foot Swelling (right foot/leg swelling - has had issues with that foot in the past)    Patient fell and hit her knee. Xray showed arthritis. She has been wearing a knee sleeve day and night. HPI:     Other   This is a new (Right foot, ankle and lower leg is swelling.) problem. The current episode started in the past 7 days (3 days). The problem occurs constantly. The problem has been gradually worsening. Associated symptoms include arthralgias (right knee). Pertinent negatives include no chills, congestion, coughing, fever, myalgias, numbness, rash or weakness. Nothing aggravates the symptoms. She has tried rest for the symptoms. The treatment provided mild relief.        Wt Readings from Last 3 Encounters:   12/05/18 120 lb (54.4 kg)   11/12/18 120 lb (54.4 kg)   02/05/18 115 lb (52.2 kg)       Temp Readings from Last 3 Encounters:   12/05/18 98.2 °F (36.8 °C) (Tympanic)   11/12/18 97.7 °F (36.5 °C)   10/16/17 98.1 °F (36.7 °C) (Tympanic)       BP Readings from Last 3 Encounters:   12/05/18 118/62   11/12/18 124/62   02/05/18 130/74       Pulse Readings from Last 3 Encounters:   12/05/18 68   11/12/18 68   02/05/18 56              Past Medical History:   Diagnosis Date    Allergic rhinitis     Anxiety     Family history of colon cancer     Glaucoma     History of elevated lipids     History of fatigue     History of mitral valve prolapse     Hyperlipidemia     Hx of    Macular degeneration     Restless legs syndrome       Past Surgical History:   Procedure Laterality Date    BREAST BIOPSY Left     CARPAL TUNNEL RELEASE      COLONOSCOPY  09/08/09    -repeat 5 years    EYE SURGERY  2012    Dr Madeline Garcia

## 2019-04-03 ENCOUNTER — OFFICE VISIT (OUTPATIENT)
Dept: PODIATRY | Age: 84
End: 2019-04-03
Payer: MEDICARE

## 2019-04-03 VITALS
BODY MASS INDEX: 21.63 KG/M2 | RESPIRATION RATE: 20 BRPM | HEIGHT: 63 IN | WEIGHT: 122.1 LBS | HEART RATE: 76 BPM | SYSTOLIC BLOOD PRESSURE: 126 MMHG | DIASTOLIC BLOOD PRESSURE: 78 MMHG

## 2019-04-03 DIAGNOSIS — B35.1 DERMATOPHYTOSIS OF NAIL: ICD-10-CM

## 2019-04-03 DIAGNOSIS — M79.674 PAIN OF TOE OF RIGHT FOOT: ICD-10-CM

## 2019-04-03 DIAGNOSIS — N28.9 RENAL INSUFFICIENCY: Primary | ICD-10-CM

## 2019-04-03 PROCEDURE — 1036F TOBACCO NON-USER: CPT | Performed by: PODIATRIST

## 2019-04-03 PROCEDURE — 1090F PRES/ABSN URINE INCON ASSESS: CPT | Performed by: PODIATRIST

## 2019-04-03 PROCEDURE — 11721 DEBRIDE NAIL 6 OR MORE: CPT | Performed by: PODIATRIST

## 2019-04-03 PROCEDURE — 1123F ACP DISCUSS/DSCN MKR DOCD: CPT | Performed by: PODIATRIST

## 2019-04-03 PROCEDURE — 4040F PNEUMOC VAC/ADMIN/RCVD: CPT | Performed by: PODIATRIST

## 2019-04-03 PROCEDURE — G8420 CALC BMI NORM PARAMETERS: HCPCS | Performed by: PODIATRIST

## 2019-04-03 PROCEDURE — G8399 PT W/DXA RESULTS DOCUMENT: HCPCS | Performed by: PODIATRIST

## 2019-04-03 PROCEDURE — 99203 OFFICE O/P NEW LOW 30 MIN: CPT | Performed by: PODIATRIST

## 2019-04-03 PROCEDURE — G8427 DOCREV CUR MEDS BY ELIG CLIN: HCPCS | Performed by: PODIATRIST

## 2019-04-03 NOTE — PROGRESS NOTES
Past Surgical History:   Procedure Laterality Date    BREAST BIOPSY Left     CARPAL TUNNEL RELEASE      COLONOSCOPY  09/08/09    -repeat 5 years    EYE SURGERY  2012    Dr Sood Flash      x2    TOE SURGERY         Family History   Problem Relation Age of Onset    Cancer Brother         colon       Social History     Tobacco Use    Smoking status: Never Smoker    Smokeless tobacco: Never Used   Substance Use Topics    Alcohol use: No       Review of Systems: All 12 systems reviewed and pertinent positives noted above. Lower Extremity Physical Examination:     Vitals:   Vitals:    04/03/19 1511   BP: 126/78   Pulse: 76   Resp: 20     General: AAO x 3 in NAD. Vascular: DP and PT pulses palpable 2/4, bilateral.  CFT <3 seconds, bilateral.  Hair growth present to the level of the digits, bilateral.  Edema present, bilateral.  Varicosities present, bilateral. Erythema absent, bilateral. Distal Rubor absent bilateral.  Temperature within normal limits bilateral. Hyperpigmentation present bilateral. Atrophic skin yes. Neurological: Sensation intact to light touch to level of digits, bilateral.  Protective sensation intact 10/10 sites via 5.07/10g [place] Monofilament, bilateral.  negative Tinel's, bilateral.  negative Valleix sign, bilateral.  Vibratory intact bilateral.  Reflexes Decreased bilateral.  Paresthesias negative. Dysthesias negative. Sharp/dull intact bilateral.  Musculoskeletal: Muscle strength 5/5, bilateral.  Pain present upon palpation R hallux nail. Onychocryptosis R hallux medial and lateral nail edge. Normal medial longitudinal arch, bilateral.  Ankle ROM within normal limits,bilateral.  1st MPJ ROM within normal limits, bilateral.  Dorsally contracted digits absent. No other foot deformities. Integument: Warm, dry, supple, bilateral. Onychocryptosis R hallux medial and lateral nail edge.  Open lesion absent, bilateral. Interdigital maceration absent to web spaces bilateral.  Nails left 1, 5 and right 1, 2, 3, 4, 5 thickened, dystrophic and crumbly, discolored with subungual debris. Fissures absent, bilateral. Hyperkeratotic tissue is absent. Asessment: Patient is a 80 y.o. female with:    Diagnosis Orders   1. Renal insufficiency  DC DEBRIDEMENT OF NAILS, 6 OR MORE   2. Dermatophytosis of nail  DC DEBRIDEMENT OF NAILS, 6 OR MORE   3. Pain of toe of right foot  DC DEBRIDEMENT OF NAILS, 6 OR MORE       Plan: Patient examined and evaluated. Current condition and treatment options discussed in detail. Orders Placed This Encounter   Procedures    DC DEBRIDEMENT OF NAILS, 6 OR MORE   different tx options for fungal nails discussed. Patient examined and evaluated. Current condition and treatment options discussed in detail. Slant back nail cut took place of R hallux nail. No need for nail procedure currently, any increase in pain of signs of infection then patient will be seen back for a nail procedure. Patient will soak qd in warm soapy water or epsom salts and will use OTC antibiotic ointment daily. All nails as mentioned above debrided in length and thickness. Patient advised OTC methods for treatment of the mycotic nails. Patient will follow up in 10 weeks. Contact office with any questions/problems/concerns. RTC in 10 week(s).

## 2019-04-15 ENCOUNTER — OFFICE VISIT (OUTPATIENT)
Dept: FAMILY MEDICINE CLINIC | Age: 84
End: 2019-04-15
Payer: MEDICARE

## 2019-04-15 VITALS
OXYGEN SATURATION: 96 % | SYSTOLIC BLOOD PRESSURE: 136 MMHG | HEART RATE: 62 BPM | BODY MASS INDEX: 21.94 KG/M2 | WEIGHT: 119.2 LBS | TEMPERATURE: 98.7 F | DIASTOLIC BLOOD PRESSURE: 70 MMHG | HEIGHT: 62 IN

## 2019-04-15 DIAGNOSIS — L03.031 PARONYCHIA OF GREAT TOE, RIGHT: Primary | ICD-10-CM

## 2019-04-15 PROCEDURE — 4040F PNEUMOC VAC/ADMIN/RCVD: CPT | Performed by: NURSE PRACTITIONER

## 2019-04-15 PROCEDURE — 3288F FALL RISK ASSESSMENT DOCD: CPT | Performed by: NURSE PRACTITIONER

## 2019-04-15 PROCEDURE — G8399 PT W/DXA RESULTS DOCUMENT: HCPCS | Performed by: NURSE PRACTITIONER

## 2019-04-15 PROCEDURE — G8510 SCR DEP NEG, NO PLAN REQD: HCPCS | Performed by: NURSE PRACTITIONER

## 2019-04-15 PROCEDURE — 99213 OFFICE O/P EST LOW 20 MIN: CPT | Performed by: NURSE PRACTITIONER

## 2019-04-15 PROCEDURE — G8427 DOCREV CUR MEDS BY ELIG CLIN: HCPCS | Performed by: NURSE PRACTITIONER

## 2019-04-15 PROCEDURE — 1123F ACP DISCUSS/DSCN MKR DOCD: CPT | Performed by: NURSE PRACTITIONER

## 2019-04-15 PROCEDURE — 1036F TOBACCO NON-USER: CPT | Performed by: NURSE PRACTITIONER

## 2019-04-15 PROCEDURE — G8420 CALC BMI NORM PARAMETERS: HCPCS | Performed by: NURSE PRACTITIONER

## 2019-04-15 PROCEDURE — 1090F PRES/ABSN URINE INCON ASSESS: CPT | Performed by: NURSE PRACTITIONER

## 2019-04-15 RX ORDER — CEPHALEXIN 500 MG/1
500 CAPSULE ORAL 3 TIMES DAILY
Qty: 21 CAPSULE | Refills: 0 | Status: SHIPPED | OUTPATIENT
Start: 2019-04-15 | End: 2019-07-01

## 2019-04-15 ASSESSMENT — PATIENT HEALTH QUESTIONNAIRE - PHQ9
SUM OF ALL RESPONSES TO PHQ QUESTIONS 1-9: 0
SUM OF ALL RESPONSES TO PHQ QUESTIONS 1-9: 0
2. FEELING DOWN, DEPRESSED OR HOPELESS: 0
1. LITTLE INTEREST OR PLEASURE IN DOING THINGS: 0
SUM OF ALL RESPONSES TO PHQ9 QUESTIONS 1 & 2: 0

## 2019-04-15 ASSESSMENT — ENCOUNTER SYMPTOMS: COLOR CHANGE: 1

## 2019-04-15 NOTE — PROGRESS NOTES
[address] [place] [address] In [address] [facility], APRN-CNP  [address]  Phone:  [phone]  Fax:  [phone]  Consuelo Butler is a 80 y.o. female who presents today for her medical conditions/complaints as noted below. [facility] [place] c/o of Toe Pain (Right great toe hurts, some redness. Has had it for a long time. Saw Dr. Sg Quigley last week for nail trim and he thought it was from long nail)      HPI:     Toe Pain    The incident occurred more than 1 week ago. Injury mechanism: She did mess with her toenail. The pain is present in the right toes. The quality of the pain is described as aching. The pain is at a severity of 3/10. Pertinent negatives include no inability to bear weight (pain with ambulation and palpation), loss of motion, loss of sensation, muscle weakness, numbness or tingling. She reports no foreign bodies present. The symptoms are aggravated by weight bearing and palpation. Treatments tried: soaked it in vinegar and went to podiatrist. The treatment provided no relief.        Wt Readings from Last 3 Encounters:   04/15/19 119 lb 3.2 oz (54.1 kg)   04/03/19 122 lb 1.6 oz (55.4 kg)   12/05/18 120 lb (54.4 kg)       Temp Readings from Last 3 Encounters:   04/15/19 98.7 °F (37.1 °C) (Tympanic)   12/05/18 98.2 °F (36.8 °C) (Tympanic)   11/12/18 97.7 °F (36.5 °C)       BP Readings from Last 3 Encounters:   04/15/19 136/70   04/03/19 126/78   12/05/18 118/62       Pulse Readings from Last 3 Encounters:   04/15/19 62   04/03/19 76   12/05/18 68              Past Medical History:   Diagnosis Date    Allergic rhinitis     Anxiety     Family history of colon cancer     Glaucoma     History of elevated lipids     History of fatigue     History of mitral valve prolapse     Hyperlipidemia     Hx of    Macular degeneration     Restless legs syndrome       Past Surgical History:   Procedure Laterality Date    BREAST BIOPSY Left     CARPAL TUNNEL RELEASE      COLONOSCOPY  09/08/09    -repeat 5 years    EYE SURGERY  2012    Dr Gibson Govern      x2    TOE SURGERY       Family History   Problem Relation Age of Onset    Cancer Brother         colon     Social History     Tobacco Use    Smoking status: Never Smoker    Smokeless tobacco: Never Used   Substance Use Topics    Alcohol use: No      Current Outpatient Medications   Medication Sig Dispense Refill    cephALEXin (KEFLEX) 500 MG capsule Take 1 capsule by mouth 3 times daily for 7 days 21 capsule 0    Ascorbic Acid (VITAMIN C) 100 MG tablet Take 100 mg by mouth daily      Misc Natural Products (OSTEO BI-FLEX ADV TRIPLE ST) TABS Take  by mouth. #2 TABS bid      Multiple Vitamins-Minerals (EYE VITAMINS) TABS Take  by mouth. I CAPS #2 BID      Lutein 40 MG CAPS Take  by mouth. #1tab bid      LUTEIN-ZEAXANTHIN PO Take 4 mg by mouth. #1 tab BID      Cholecalciferol (VITAMIN D PO) Take  by mouth. None past week      ATENOLOL PO Take 25 mg by mouth daily. No current facility-administered medications for this visit. Allergies   Allergen Reactions    Beconase [Beclomethasone]     Benadryl [Diphenhydramine]     Bicillin C-R [Penicillin G Benzathine & Proc]     Chloramphenicols     Ephedrine     Niacin And Related     Other      Flu Vaccine  Pseudophedrine  Chlordrine  Chlorpeniramine    Penicillins     Phenergan [Promethazine Hcl]     Phenylpropanolamine     Propacetamol     Proventil [Albuterol]     Seldane [Terfenadine]     Timoptic [Timolol Maleate]     Triamcinolone        No exam data present    Subjective:      Review of Systems   Constitutional: Negative for fever. Musculoskeletal: Positive for gait problem (pain with ambulation) and myalgias. Skin: Positive for color change. Neurological: Negative for tingling and numbness.        Objective:     /70 (Site: Right Upper Arm, Position: Sitting, Cuff Size: Medium Adult)   Pulse 62 Temp 98.7 °F (37.1 °C) (Tympanic)   Ht 5' 1.5\" (1.562 m) Comment: no shoes  Wt 119 lb 3.2 oz (54.1 kg) Comment: no shoes  SpO2 96%   BMI 22.16 kg/m²     Physical Exam   Constitutional: She appears well-developed and well-nourished. No distress. Pulmonary/Chest: Effort normal.   Musculoskeletal: She exhibits edema and tenderness. She exhibits no deformity. Right foot: There is tenderness and swelling. There is no bony tenderness, normal capillary refill, no crepitus, no deformity and no laceration. Feet:    Feet:   Right Foot:   Skin Integrity: Positive for erythema and warmth. Negative for ulcer, blister, skin breakdown, callus or dry skin. Left Foot:   Skin Integrity: Negative for erythema or warmth. Skin: Skin is warm, dry and intact. Capillary refill takes less than 2 seconds. She is not diaphoretic. There is erythema (as noted under muscular). Nursing note and vitals reviewed. Assessment:      Diagnosis Orders   1. Paronychia of great toe, right  cephALEXin (KEFLEX) 500 MG capsule     No results found for this visit on 04/15/19. Plan:         Cephalexin three times a day for 7 days. Warm epsom salt soaks 3 times a day for 20 minutes each time. Follow up with primary care provider in 1 to 2 days. Patient Instructions     Cephalexin three times a day for 7 days. Warm epsom salt soaks 3 times a day for 20 minutes each time. Follow up with primary care provider in 1 to 2 days. Patient Education        Paronychia: Care Instructions  Your Care Instructions  Paronychia (say \"hulj-zi-RT-jaswinder-uh\") is an infection of the skin around a fingernail or toenail. It happens when germs enter through a break in the skin. The doctor may have made a small cut in the infected area to drain the pus. Most cases of paronychia improve in a few days. But watch your symptoms and follow your doctor's advice.  Though rare, a mild case can turn into something more serious and infect as:  ? Increased pain, swelling, warmth, or redness. ? Red streaks leading from the infected skin. ? Pus draining from the area. ? A fever.    Watch closely for changes in your health, and be sure to contact your doctor if:    · You do not get better as expected. Where can you learn more? Go to https://chpepiceweb.healthfav.or.it. org and sign in to your [employer] account. Enter 0911 [id number] [phone] in the IndianStage box to learn more about \"Paronychia: Care Instructions. \"     If you do not have an account, please click on the \"Sign Up Now\" link. Current as of: April 17, 2018  Content Version: 11.9  © [id number] [employer]. Care instructions adapted under license by [facility] ([facility]). If you have questions about a medical condition or this instruction, always ask your healthcare professional. [place] [address] any warranty or liability for your use of this information. Patient Education        Paronychia: Care Instructions  Your Care Instructions  Paronychia (say \"vwkc-oo-PV-jaswinder-uh\") is an infection of the skin around a fingernail or toenail. It happens when germs enter through a break in the skin. The doctor may have made a small cut in the infected area to drain the pus. Most cases of paronychia improve in a few days. But watch your symptoms and follow your doctor's advice. Though rare, a mild case can turn into something more serious and infect your entire finger or toe. Also, it is possible for an infection to return. Follow-up care is a key part of your treatment and safety. Be sure to make and go to all appointments, and call your doctor if you are having problems. It's also a good idea to know your test results and keep a list of the medicines you take. How can you care for yourself at home? · If your doctor told you how to care for your infected nail, follow the doctor's instructions. If you did not get instructions, follow this general advice:  ?  Wash the area with clean water 2 times a day. Don't use hydrogen peroxide or alcohol, which can slow healing. ? You may cover the area with a thin layer of petroleum jelly, such as Vaseline, and a nonstick bandage. ? Apply more petroleum jelly and replace the bandage as needed. · If your doctor prescribed antibiotics, take them as directed. Do not stop taking them just because you feel better. You need to take the full course of antibiotics. · Take an over-the-counter pain medicine, such as acetaminophen (Tylenol), ibuprofen (Advil, Motrin), or naproxen (Aleve). Read and follow all instructions on the label. · Do not take two or more pain medicines at the same time unless the doctor told you to. Many pain medicines have acetaminophen, which is Tylenol. Too much acetaminophen (Tylenol) can be harmful. · Prop up the toe or finger so that it is higher than the level of your heart. This will help with pain and swelling. · Apply heat. Put a warm water bottle, heating pad set on low, or warm cloth on your finger or toe. Do not go to sleep with a heating pad on your skin. · Soak the area in warm water twice a day for 15 minutes each time. After soaking, dry the area well and apply a thin layer of petroleum jelly, such as Vaseline. Put on a new bandage. When should you call for help? Call your doctor now or seek immediate medical care if:    · You have signs of new or worsening infection, such as:  ? Increased pain, swelling, warmth, or redness. ? Red streaks leading from the infected skin. ? Pus draining from the area. ? A fever.    Watch closely for changes in your health, and be sure to contact your doctor if:    · You do not get better as expected. Where can you learn more? Go to https://CrazideapePlayGigaeb.Silvercare Solutions. org and sign in to your adSage account. Enter 0911 [id number] [phone] in the [facility] box to learn more about \"Paronychia: Care Instructions. \"     If you do not have an account, please click on the \"Sign Up Now\" link.  Current as of: April 17, 2018  Content Version: 11.9  © 8519-3945 [employer]. Care instructions adapted under license by [facility] ([facility]). If you have questions about a medical condition or this instruction, always ask your healthcare professional. [place] [address] any warranty or liability for your use of this information. Patient/Caregiver instructed on use, benefit, and side effects of prescribed medications. All patient/parent/caregiver questions answered. Patient/parent/caregiver voiced understanding. Reviewed health maintenance. Instructed to continue current medications, diet and exercise. Patient agreed with treatment plan. Follow up as directed.            Electronically signed by ROSE Schofield CNP on4/15/2019

## 2019-04-15 NOTE — PATIENT INSTRUCTIONS
Cephalexin three times a day for 7 days. Warm epsom salt soaks 3 times a day for 20 minutes each time. Follow up with primary care provider in 1 to 2 days. Patient Education        Paronychia: Care Instructions  Your Care Instructions  Paronychia (say \"ufxo-zs-AV-jaswinder-kings\") is an infection of the skin around a fingernail or toenail. It happens when germs enter through a break in the skin. The doctor may have made a small cut in the infected area to drain the pus. Most cases of paronychia improve in a few days. But watch your symptoms and follow your doctor's advice. Though rare, a mild case can turn into something more serious and infect your entire finger or toe. Also, it is possible for an infection to return. Follow-up care is a key part of your treatment and safety. Be sure to make and go to all appointments, and call your doctor if you are having problems. It's also a good idea to know your test results and keep a list of the medicines you take. How can you care for yourself at home? · If your doctor told you how to care for your infected nail, follow the doctor's instructions. If you did not get instructions, follow this general advice:  ? Wash the area with clean water 2 times a day. Don't use hydrogen peroxide or alcohol, which can slow healing. ? You may cover the area with a thin layer of petroleum jelly, such as Vaseline, and a nonstick bandage. ? Apply more petroleum jelly and replace the bandage as needed. · If your doctor prescribed antibiotics, take them as directed. Do not stop taking them just because you feel better. You need to take the full course of antibiotics. · Take an over-the-counter pain medicine, such as acetaminophen (Tylenol), ibuprofen (Advil, Motrin), or naproxen (Aleve). Read and follow all instructions on the label. · Do not take two or more pain medicines at the same time unless the doctor told you to. Many pain medicines have acetaminophen, which is Tylenol.  Too much acetaminophen (Tylenol) can be harmful. · Prop up the toe or finger so that it is higher than the level of your heart. This will help with pain and swelling. · Apply heat. Put a warm water bottle, heating pad set on low, or warm cloth on your finger or toe. Do not go to sleep with a heating pad on your skin. · Soak the area in warm water twice a day for 15 minutes each time. After soaking, dry the area well and apply a thin layer of petroleum jelly, such as Vaseline. Put on a new bandage. When should you call for help? Call your doctor now or seek immediate medical care if:    · You have signs of new or worsening infection, such as:  ? Increased pain, swelling, warmth, or redness. ? Red streaks leading from the infected skin. ? Pus draining from the area. ? A fever.    Watch closely for changes in your health, and be sure to contact your doctor if:    · You do not get better as expected. Where can you learn more? Go to https://[employer] [place].AppLearn. org and sign in to your Covestor account. Enter 0911 [id number] [phone] in the [facility] box to learn more about \"Paronychia: Care Instructions. \"     If you do not have an account, please click on the \"Sign Up Now\" link. Current as of: April 17, 2018  Content Version: 11.9  © 5646-8439 [employer]. Care instructions adapted under license by [facility] ([facility]). If you have questions about a medical condition or this instruction, always ask your healthcare professional. [place] [address] any warranty or liability for your use of this information. Patient Education        Paronychia: Care Instructions  Your Care Instructions  Paronychia (say \"nerg-ev-FJ-jaswinder-uh\") is an infection of the skin around a fingernail or toenail. It happens when germs enter through a break in the skin. The doctor may have made a small cut in the infected area to drain the pus. Most cases of paronychia improve in a few days.  But watch your symptoms and

## 2019-05-01 LAB
ALBUMIN: 4.2 G/DL (ref 3.5–5)
BUN BLDV-MCNC: 19 MG/DL (ref 7–18)
CALCIUM SERPL-MCNC: 9.9 MG/DL (ref 8.5–10.1)
CHLORIDE BLD-SCNC: 106 MMOL/L (ref 97–107)
CO2: 26 MMOL/L (ref 21–32)
CREAT SERPL-MCNC: 1.1 MG/DL (ref 0.55–1.02)
CREATININE URINE: 60.5 MG/DL
GFR CALCULATED: 50
GLUCOSE: 95 MG/DL (ref 70–99)
HEMOGLOBIN: 12.7 G/DL (ref 11.7–14.9)
MAGNESIUM: 2 MG/DL (ref 1.8–2.4)
PHOSPHORUS: 3.5 MG/DL (ref 2.5–4.9)
POTASSIUM SERPL-SCNC: 4.2 MMOL/L (ref 3.5–5.1)
SODIUM BLD-SCNC: 141 MMOL/L (ref 136–145)

## 2019-06-26 ENCOUNTER — OFFICE VISIT (OUTPATIENT)
Dept: PODIATRY | Age: 84
End: 2019-06-26
Payer: MEDICARE

## 2019-06-26 VITALS
SYSTOLIC BLOOD PRESSURE: 115 MMHG | DIASTOLIC BLOOD PRESSURE: 64 MMHG | BODY MASS INDEX: 22.47 KG/M2 | HEART RATE: 60 BPM | HEIGHT: 61 IN | WEIGHT: 119 LBS

## 2019-06-26 DIAGNOSIS — M79.674 PAIN OF TOE OF RIGHT FOOT: ICD-10-CM

## 2019-06-26 DIAGNOSIS — N28.9 RENAL INSUFFICIENCY: Primary | ICD-10-CM

## 2019-06-26 DIAGNOSIS — B35.1 DERMATOPHYTOSIS OF NAIL: ICD-10-CM

## 2019-06-26 PROCEDURE — 1090F PRES/ABSN URINE INCON ASSESS: CPT | Performed by: PODIATRIST

## 2019-06-26 PROCEDURE — G8427 DOCREV CUR MEDS BY ELIG CLIN: HCPCS | Performed by: PODIATRIST

## 2019-06-26 PROCEDURE — 11721 DEBRIDE NAIL 6 OR MORE: CPT | Performed by: PODIATRIST

## 2019-06-26 PROCEDURE — 1036F TOBACCO NON-USER: CPT | Performed by: PODIATRIST

## 2019-06-26 PROCEDURE — 4040F PNEUMOC VAC/ADMIN/RCVD: CPT | Performed by: PODIATRIST

## 2019-06-26 PROCEDURE — G8399 PT W/DXA RESULTS DOCUMENT: HCPCS | Performed by: PODIATRIST

## 2019-06-26 PROCEDURE — 1123F ACP DISCUSS/DSCN MKR DOCD: CPT | Performed by: PODIATRIST

## 2019-06-26 PROCEDURE — G8420 CALC BMI NORM PARAMETERS: HCPCS | Performed by: PODIATRIST

## 2019-06-26 PROCEDURE — 99213 OFFICE O/P EST LOW 20 MIN: CPT | Performed by: PODIATRIST

## 2019-06-26 NOTE — PROGRESS NOTES
Subjective:  Shanta Benito is a 80 y.o. female who presents to the office today complaining of pain R big toe. .  Symptoms come and go. Patient relates pain is Present. Pain is rated 2 out of 10 and is described as intermittent. pt also presents for routine foot care. Pt has new issue of heel pain. She does better with her tennis shoes. No tx tried recently. She has a hx of using orthotics. Currently denies F/C/N/V. Allergies   Allergen Reactions    Beconase [Beclomethasone]     Benadryl [Diphenhydramine]     Bicillin C-R [Penicillin G Benzathine & Proc]     Chloramphenicols     Ephedrine     Niacin And Related     Other      Flu Vaccine  Pseudophedrine  Chlordrine  Chlorpeniramine    Penicillins     Phenergan [Promethazine Hcl]     Phenylpropanolamine     Propacetamol     Proventil [Albuterol]     Seldane [Terfenadine]     Timoptic [Timolol Maleate]     Triamcinolone        Past Medical History:   Diagnosis Date    Allergic rhinitis     Anxiety     Family history of colon cancer     Glaucoma     History of elevated lipids     History of fatigue     History of mitral valve prolapse     Hyperlipidemia     Hx of    Macular degeneration     Restless legs syndrome        Prior to Admission medications    Medication Sig Start Date End Date Taking? Authorizing Provider   Ascorbic Acid (VITAMIN C) 100 MG tablet Take 100 mg by mouth daily   Yes Historical Provider, MD   Misc Natural Products (OSTEO BI-FLEX ADV TRIPLE ST) TABS Take  by mouth. #2 TABS bid   Yes Historical Provider, MD   Multiple Vitamins-Minerals (EYE VITAMINS) TABS Take  by mouth. I CAPS #2 BID   Yes Historical Provider, MD   Lutein 40 MG CAPS Take  by mouth. #1tab bid   Yes Historical Provider, MD   LUTEIN-ZEAXANTHIN PO Take 4 mg by mouth. #1 tab BID   Yes Historical Provider, MD   Cholecalciferol (VITAMIN D PO) Take  by mouth. None past week   Yes Historical Provider, MD   ATENOLOL PO Take 25 mg by mouth daily. Yes Historical Provider, MD       Past Surgical History:   Procedure Laterality Date    BREAST BIOPSY Left     CARPAL TUNNEL RELEASE      COLONOSCOPY  09/08/09    -repeat 5 years    EYE SURGERY  2012    Dr Gabby Waters      x2    TOE SURGERY         Family History   Problem Relation Age of Onset    Cancer Brother         colon       Social History     Tobacco Use    Smoking status: Never Smoker    Smokeless tobacco: Never Used   Substance Use Topics    Alcohol use: No       Review of Systems: All 12 systems reviewed and pertinent positives noted above. Lower Extremity Physical Examination:     Vitals:   Vitals:    06/26/19 1310   Pulse: 60     General: AAO x 3 in NAD. Vascular: DP and PT pulses palpable 2/4, bilateral.  CFT <3 seconds, bilateral.  Hair growth present to the level of the digits, bilateral.  Edema present, bilateral.  Varicosities present, bilateral. Erythema absent, bilateral. Distal Rubor absent bilateral.  Temperature within normal limits bilateral. Hyperpigmentation present bilateral. Atrophic skin yes. Neurological: Sensation intact to light touch to level of digits, bilateral.  Protective sensation intact 10/10 sites via 5.07/10g [place] Monofilament, bilateral.  negative Tinel's, bilateral.  negative Valleix sign, bilateral.  Vibratory intact bilateral.  Reflexes Decreased bilateral.  Paresthesias negative. Dysthesias negative. Sharp/dull intact bilateral.  Musculoskeletal: Muscle strength 5/5, bilateral.  Pain present upon palpation R hallux nail. Hx of pain b/l heel plantar medial calcaneal tubercle. Onychocryptosis R hallux medial and lateral nail edge. Normal medial longitudinal arch, bilateral.  Ankle ROM within normal limits,bilateral.  1st MPJ ROM within normal limits, bilateral.  Dorsally contracted digits absent. No other foot deformities.   Integument: Warm, dry, supple, bilateral. Onychocryptosis R hallux medial and lateral nail edge. Open lesion absent, bilateral.  Interdigital maceration absent to web spaces bilateral.  Nails left 1, 5 and right 1, 2, 3, 4, 5 thickened, dystrophic and crumbly, discolored with subungual debris. Fissures absent, bilateral. Hyperkeratotic tissue is absent. Asessment: Patient is a 80 y.o. female with:    Diagnosis Orders   1. Renal insufficiency  AZ DEBRIDEMENT OF NAILS, 6 OR MORE   2. Dermatophytosis of nail  AZ DEBRIDEMENT OF NAILS, 6 OR MORE   3. Pain of toe of right foot  AZ DEBRIDEMENT OF NAILS, 6 OR MORE       Plan: Patient examined and evaluated. Current condition and treatment options discussed in detail. Orders Placed This Encounter   Procedures    AZ DEBRIDEMENT OF NAILS, 6 OR MORE   different tx options for fungal nails discussed. Patient examined and evaluated. Current condition and treatment options discussed in detail. Slant back nail cut took place of R hallux nail. No need for nail procedure currently, any increase in pain of signs of infection then patient will be seen back for a nail procedure. Patient will soak qd in warm soapy water or epsom salts and will use OTC antibiotic ointment daily. All nails as mentioned above debrided in length and thickness. Patient advised OTC methods for treatment of the mycotic nails. Patient will follow up in 10 weeks. Plantar Fasciitis Treatment    1. Stretching - 3 times per day minimum for 5-10 minutes. Include stretching against a wall or counter with one foot back and heel flat,    Leaning into a step, or using a towel or belt. Be active and aggressive with your stretching for maximum benefit. 2.  Ice - 3 times per day. Use a frozen water bottle and roll your foot over it. Helps to  stretch and massage the area at the same time. 3.  Anti-inflammatory - take OTC or Rx as ordered. 4.  Arch support and supportive shoe gear.    Wear orthotics or prefabricated arch supports at ALL times in supportive shoegear. Do NOT go barefoot. Contact office with any questions/problems/concerns. RTC in 10 week(s).

## 2019-07-01 ENCOUNTER — OFFICE VISIT (OUTPATIENT)
Dept: FAMILY MEDICINE CLINIC | Age: 84
End: 2019-07-01
Payer: MEDICARE

## 2019-07-01 VITALS
BODY MASS INDEX: 21.92 KG/M2 | SYSTOLIC BLOOD PRESSURE: 114 MMHG | TEMPERATURE: 98.6 F | OXYGEN SATURATION: 98 % | WEIGHT: 116 LBS | DIASTOLIC BLOOD PRESSURE: 60 MMHG | HEART RATE: 60 BPM | RESPIRATION RATE: 18 BRPM

## 2019-07-01 DIAGNOSIS — J02.0 STREP THROAT: Primary | ICD-10-CM

## 2019-07-01 DIAGNOSIS — J02.9 SORE THROAT: ICD-10-CM

## 2019-07-01 PROBLEM — H26.40 SECONDARY CATARACT, BILATERAL: Status: ACTIVE | Noted: 2018-11-16

## 2019-07-01 PROBLEM — J44.9 COPD (CHRONIC OBSTRUCTIVE PULMONARY DISEASE) (HCC): Status: ACTIVE | Noted: 2019-07-01

## 2019-07-01 PROBLEM — I11.9 HYPERTENSIVE HEART DISEASE WITHOUT HEART FAILURE: Status: ACTIVE | Noted: 2018-11-14

## 2019-07-01 PROBLEM — H40.1190 PRIMARY OPEN ANGLE GLAUCOMA (POAG): Status: ACTIVE | Noted: 2017-05-10

## 2019-07-01 LAB — S PYO AG THROAT QL: POSITIVE

## 2019-07-01 PROCEDURE — 4040F PNEUMOC VAC/ADMIN/RCVD: CPT | Performed by: NURSE PRACTITIONER

## 2019-07-01 PROCEDURE — 1090F PRES/ABSN URINE INCON ASSESS: CPT | Performed by: NURSE PRACTITIONER

## 2019-07-01 PROCEDURE — G8427 DOCREV CUR MEDS BY ELIG CLIN: HCPCS | Performed by: NURSE PRACTITIONER

## 2019-07-01 PROCEDURE — 99213 OFFICE O/P EST LOW 20 MIN: CPT | Performed by: NURSE PRACTITIONER

## 2019-07-01 PROCEDURE — G8399 PT W/DXA RESULTS DOCUMENT: HCPCS | Performed by: NURSE PRACTITIONER

## 2019-07-01 PROCEDURE — 87880 STREP A ASSAY W/OPTIC: CPT | Performed by: NURSE PRACTITIONER

## 2019-07-01 PROCEDURE — 1036F TOBACCO NON-USER: CPT | Performed by: NURSE PRACTITIONER

## 2019-07-01 PROCEDURE — 1123F ACP DISCUSS/DSCN MKR DOCD: CPT | Performed by: NURSE PRACTITIONER

## 2019-07-01 PROCEDURE — G8420 CALC BMI NORM PARAMETERS: HCPCS | Performed by: NURSE PRACTITIONER

## 2019-07-01 RX ORDER — CEPHALEXIN 500 MG/1
500 CAPSULE ORAL 3 TIMES DAILY
Qty: 30 CAPSULE | Refills: 0 | Status: SHIPPED | OUTPATIENT
Start: 2019-07-01 | End: 2019-10-16

## 2019-07-01 ASSESSMENT — ENCOUNTER SYMPTOMS
COUGH: 0
ABDOMINAL PAIN: 0
VOMITING: 0
NAUSEA: 0
SORE THROAT: 1

## 2019-07-01 NOTE — PROGRESS NOTES
to person, place, and time. Vital signs are normal. She appears well-developed and well-nourished. No distress. HENT:   Head: Normocephalic. Right Ear: Tympanic membrane and external ear normal.   Left Ear: Tympanic membrane and external ear normal.   Nose: Nose normal.   Mouth/Throat: Posterior oropharyngeal erythema present. No tonsillar exudate. Eyes: Conjunctivae and EOM are normal. Right eye exhibits no discharge. Left eye exhibits no discharge. No scleral icterus. Neck: Normal range of motion. Neck supple. Pulmonary/Chest: Effort normal. No respiratory distress. Musculoskeletal: Normal range of motion. Neurological: She is alert and oriented to person, place, and time. Skin: Skin is warm, dry and intact. Capillary refill takes less than 2 seconds. She is not diaphoretic. Psychiatric: She has a normal mood and affect. Her speech is normal and behavior is normal. Judgment and thought content normal. Cognition and memory are normal.   Nursing note and vitals reviewed. Assessment:      Diagnosis Orders   1. Strep throat  cephALEXin (KEFLEX) 500 MG capsule   2. Sore throat  POCT rapid strep A     No results found for this visit on 07/01/19. Plan:   Strep test is positive. Keflex 3 times a day for 10 days. Continue salt water gargles. Follow up with primary care provider in 1 to 2 days if needed. Patient Instructions     Keflex 3 times a day for 10 days. Continue salt water gargles. Follow up with primary care provider in 1 to 2 days if needed. Patient Education        Strep Throat: Care Instructions  Your Care Instructions    Strep throat is a bacterial infection that causes sudden, severe sore throat and fever. Strep throat, which is caused by bacteria called streptococcus, is treated with antibiotics. Sometimes a strep test is necessary to tell if the sore throat is caused by strep bacteria.  Treatment can help ease symptoms and may prevent future help?  Call your doctor now or seek immediate medical care if:    · You have a new or higher fever.     · You have a fever with a stiff neck or severe headache.     · You have new or worse trouble swallowing.     · Your sore throat gets much worse on one side.     · Your pain becomes much worse on one side of your throat.    Watch closely for changes in your health, and be sure to contact your doctor if:    · You are not getting better after 2 days (48 hours).     · You do not get better as expected. Where can you learn more? Go to https://ISVWorldpepicAudax Medicaleb.Oculus360. org and sign in to your Kloud[employer] account. Enter K625 in the [employer] box to learn more about \"Strep Throat: Care Instructions. \"     If you do not have an account, please click on the \"Sign Up Now\" link. Current as of: October 21, 2018  Content Version: 12.0  © 0385-4414 [employer]. Care instructions adapted under license by [facility] ([facility]). If you have questions about a medical condition or this instruction, always ask your healthcare professional. [place] [address] any warranty or liability for your use of this information. Patient/Caregiver instructed on use, benefit, and side effects of prescribed medications. All patient/parent/caregiver questions answered. Patient/parent/caregiver voiced understanding. Reviewed health maintenance. Instructed to continue current medications, diet and exercise. Patient agreed with treatment plan. Follow up as directed.            Electronically signed by ROSE Olmstead CNP on7/1/2019

## 2019-07-01 NOTE — PATIENT INSTRUCTIONS
Keflex 3 times a day for 10 days. Continue salt water gargles. Follow up with primary care provider in 1 to 2 days if needed. Patient Education        Strep Throat: Care Instructions  Your Care Instructions    Strep throat is a bacterial infection that causes sudden, severe sore throat and fever. Strep throat, which is caused by bacteria called streptococcus, is treated with antibiotics. Sometimes a strep test is necessary to tell if the sore throat is caused by strep bacteria. Treatment can help ease symptoms and may prevent future problems. Follow-up care is a key part of your treatment and safety. Be sure to make and go to all appointments, and call your doctor if you are having problems. It's also a good idea to know your test results and keep a list of the medicines you take. How can you care for yourself at home? · Take your antibiotics as directed. Do not stop taking them just because you feel better. You need to take the full course of antibiotics. · Strep throat can spread to others until 24 hours after you begin taking antibiotics. During this time, you should avoid contact with other people at work or home, especially infants and children. Do not sneeze or cough on others, and wash your hands often. Keep your drinking glass and eating utensils separate from those of others, and wash these items well in hot, soapy water. · Gargle with warm salt water at least once each hour to help reduce swelling and make your throat feel better. Use 1 teaspoon of salt mixed in 8 fluid ounces of warm water. · Take an over-the-counter pain medication, such as acetaminophen (Tylenol), ibuprofen (Advil, Motrin), or naproxen (Aleve). Read and follow all instructions on the label. · Try an over-the-counter anesthetic throat spray or throat lozenges, which may help relieve throat pain. · Drink plenty of fluids. Fluids may help soothe an irritated throat.  Hot fluids, such as tea or soup, may help your throat feel better. · Eat soft solids and drink plenty of clear liquids. Flavored ice pops, ice cream, scrambled eggs, sherbet, and gelatin dessert (such as Jell-O) may also soothe the throat. · Get lots of rest.  · Do not smoke, and avoid secondhand smoke. If you need help quitting, talk to your doctor about stop-smoking programs and medicines. These can increase your chances of quitting for good. · Use a vaporizer or humidifier to add moisture to the air in your bedroom. Follow the directions for cleaning the machine. When should you call for help? Call your doctor now or seek immediate medical care if:    · You have a new or higher fever.     · You have a fever with a stiff neck or severe headache.     · You have new or worse trouble swallowing.     · Your sore throat gets much worse on one side.     · Your pain becomes much worse on one side of your throat.    Watch closely for changes in your health, and be sure to contact your doctor if:    · You are not getting better after 2 days (48 hours).     · You do not get better as expected. Where can you learn more? Go to [email or web address] org and sign in to your [employer] account. Enter K625 in the [facility] box to learn more about \"Strep Throat: Care Instructions. \"     If you do not have an account, please click on the \"Sign Up Now\" link. Current as of: October 21, 2018  Content Version: 12.0  © 6769-8962 [employer], [employer]orporated. Care instructions adapted under license by [facility] ([facility]). If you have questions about a medical condition or this instruction, always ask your healthcare professional. [place] [address] any warranty or liability for your use of this information.

## 2019-10-09 ENCOUNTER — OFFICE VISIT (OUTPATIENT)
Dept: FAMILY MEDICINE CLINIC | Age: 84
End: 2019-10-09
Payer: MEDICARE

## 2019-10-09 VITALS
HEART RATE: 54 BPM | OXYGEN SATURATION: 99 % | SYSTOLIC BLOOD PRESSURE: 120 MMHG | WEIGHT: 121.4 LBS | HEIGHT: 62 IN | TEMPERATURE: 98.5 F | DIASTOLIC BLOOD PRESSURE: 80 MMHG | BODY MASS INDEX: 22.34 KG/M2

## 2019-10-09 DIAGNOSIS — R19.7 DIARRHEA, UNSPECIFIED TYPE: Primary | ICD-10-CM

## 2019-10-09 DIAGNOSIS — J06.9 VIRAL URI: ICD-10-CM

## 2019-10-09 LAB
A/G RATIO: 1.3 RATIO
AGE FOR GFR: 85
ALBUMIN: 4 G/DL (ref 3.5–5)
ALK PHOSPHATASE: 67 UNITS/L (ref 38–126)
ALT SERPL-CCNC: 26 UNITS/L (ref 9–52)
ANION GAP SERPL CALCULATED.3IONS-SCNC: 11 MMOL/L
AST SERPL-CCNC: 31 UNITS/L (ref 14–36)
BASOPHILS # BLD: 0.1 THOU/MM3 (ref 0–0.3)
BILIRUB SERPL-MCNC: 0.3 MG/DL (ref 0.2–1.3)
BUN BLDV-MCNC: 24 MG/DL (ref 7–17)
C DIFF TOXIN: NORMAL
CALCIUM SERPL-MCNC: 9.4 MG/DL (ref 8.4–10.2)
CHLORIDE BLD-SCNC: 101 MMOL/L (ref 98–120)
CO2: 33 MMOL/L (ref 22–31)
CREAT SERPL-MCNC: 1.1 MG/DL (ref 0.5–1)
CULTURE, STOOL: NORMAL
DIFFERENTIAL: AUTOMATED DIFF
EGFR BF: 57 ML/MIN/1.73 M2
EGFR BM: 77 ML/MIN/1.73 M2
EGFR WF: 47 ML/MIN/1.73 M2
EGFR WM: 64 ML/MIN/1.73 M2
EOSINOPHIL # BLD: 0.12 THOU/MM3 (ref 0–1.1)
GLOBULIN: 3 G/DL
GLUCOSE: 98 MG/DL (ref 65–105)
HCT VFR BLD CALC: 36.9 % (ref 37–47)
HEMOCCULT STL QL: NEGATIVE
HEMOGLOBIN: 11.4 G/DL (ref 12–16)
LYMPHOCYTES # BLD: 2.62 THOU/MM3 (ref 1–5.5)
MCH RBC QN AUTO: 29.2 PG (ref 28.5–32)
MCHC RBC AUTO-ENTMCNC: 31 G/DL (ref 32–37)
MCV RBC AUTO: 94.1 FL (ref 80–94)
MONOCYTES # BLD: 0.75 THOU/MM3 (ref 0.1–1)
NEUTROPHILS: 4.2 THOU/MM3 (ref 2–8.1)
PDW BLD-RTO: 12.4 % (ref 8.5–15.5)
PLATELET # BLD: 203 THOU/MM3 (ref 130–400)
PMV BLD AUTO: 7.1 FL (ref 7.4–11)
POTASSIUM SERPL-SCNC: 4.9 MMOL/L (ref 3.6–5)
RBC # BLD: 3.92 M/UL (ref 4.2–5.4)
SODIUM BLD-SCNC: 140 MMOL/L (ref 135–145)
STOOL FOR WBC: NORMAL
TOTAL PROTEIN: 7 G/DL (ref 6.3–8.2)
WBC # BLD: 7.78 THOU/ML3 (ref 4.8–10)

## 2019-10-09 PROCEDURE — G8484 FLU IMMUNIZE NO ADMIN: HCPCS | Performed by: NURSE PRACTITIONER

## 2019-10-09 PROCEDURE — 1090F PRES/ABSN URINE INCON ASSESS: CPT | Performed by: NURSE PRACTITIONER

## 2019-10-09 PROCEDURE — G8420 CALC BMI NORM PARAMETERS: HCPCS | Performed by: NURSE PRACTITIONER

## 2019-10-09 PROCEDURE — G8399 PT W/DXA RESULTS DOCUMENT: HCPCS | Performed by: NURSE PRACTITIONER

## 2019-10-09 PROCEDURE — G8427 DOCREV CUR MEDS BY ELIG CLIN: HCPCS | Performed by: NURSE PRACTITIONER

## 2019-10-09 PROCEDURE — 1036F TOBACCO NON-USER: CPT | Performed by: NURSE PRACTITIONER

## 2019-10-09 PROCEDURE — 99214 OFFICE O/P EST MOD 30 MIN: CPT | Performed by: NURSE PRACTITIONER

## 2019-10-09 PROCEDURE — 1123F ACP DISCUSS/DSCN MKR DOCD: CPT | Performed by: NURSE PRACTITIONER

## 2019-10-09 PROCEDURE — 4040F PNEUMOC VAC/ADMIN/RCVD: CPT | Performed by: NURSE PRACTITIONER

## 2019-10-09 ASSESSMENT — ENCOUNTER SYMPTOMS
VOMITING: 0
FLATUS: 0
ABDOMINAL PAIN: 0
BLOATING: 0
COUGH: 1
DIARRHEA: 1

## 2019-10-11 DIAGNOSIS — R19.7 DIARRHEA, UNSPECIFIED TYPE: ICD-10-CM

## 2019-10-18 ENCOUNTER — OFFICE VISIT (OUTPATIENT)
Dept: FAMILY MEDICINE CLINIC | Age: 84
End: 2019-10-18
Payer: MEDICARE

## 2019-10-18 VITALS
HEART RATE: 74 BPM | OXYGEN SATURATION: 95 % | WEIGHT: 118 LBS | SYSTOLIC BLOOD PRESSURE: 142 MMHG | BODY MASS INDEX: 21.58 KG/M2 | DIASTOLIC BLOOD PRESSURE: 80 MMHG

## 2019-10-18 DIAGNOSIS — R19.7 DIARRHEA, UNSPECIFIED TYPE: Primary | ICD-10-CM

## 2019-10-18 DIAGNOSIS — I10 ESSENTIAL HYPERTENSION: ICD-10-CM

## 2019-10-18 PROCEDURE — 1123F ACP DISCUSS/DSCN MKR DOCD: CPT | Performed by: FAMILY MEDICINE

## 2019-10-18 PROCEDURE — G8420 CALC BMI NORM PARAMETERS: HCPCS | Performed by: FAMILY MEDICINE

## 2019-10-18 PROCEDURE — 99214 OFFICE O/P EST MOD 30 MIN: CPT | Performed by: FAMILY MEDICINE

## 2019-10-18 PROCEDURE — 4040F PNEUMOC VAC/ADMIN/RCVD: CPT | Performed by: FAMILY MEDICINE

## 2019-10-18 PROCEDURE — 1090F PRES/ABSN URINE INCON ASSESS: CPT | Performed by: FAMILY MEDICINE

## 2019-10-18 PROCEDURE — G8427 DOCREV CUR MEDS BY ELIG CLIN: HCPCS | Performed by: FAMILY MEDICINE

## 2019-10-18 PROCEDURE — G8484 FLU IMMUNIZE NO ADMIN: HCPCS | Performed by: FAMILY MEDICINE

## 2019-10-18 PROCEDURE — 1036F TOBACCO NON-USER: CPT | Performed by: FAMILY MEDICINE

## 2019-10-18 PROCEDURE — G8399 PT W/DXA RESULTS DOCUMENT: HCPCS | Performed by: FAMILY MEDICINE

## 2019-10-18 ASSESSMENT — ENCOUNTER SYMPTOMS
DIARRHEA: 1
RESPIRATORY NEGATIVE: 1

## 2019-12-05 ENCOUNTER — OFFICE VISIT (OUTPATIENT)
Dept: FAMILY MEDICINE CLINIC | Age: 84
End: 2019-12-05
Payer: MEDICARE

## 2019-12-05 VITALS
SYSTOLIC BLOOD PRESSURE: 152 MMHG | DIASTOLIC BLOOD PRESSURE: 72 MMHG | HEART RATE: 58 BPM | TEMPERATURE: 98 F | HEIGHT: 63 IN | WEIGHT: 121.6 LBS | OXYGEN SATURATION: 98 % | BODY MASS INDEX: 21.55 KG/M2

## 2019-12-05 DIAGNOSIS — I49.9 IRREGULAR HEART BEAT: ICD-10-CM

## 2019-12-05 DIAGNOSIS — R53.83 FATIGUE, UNSPECIFIED TYPE: Primary | ICD-10-CM

## 2019-12-05 LAB
BILIRUBIN, POC: NORMAL
BLOOD URINE, POC: NORMAL
CLARITY, POC: NORMAL
COLOR, POC: NORMAL
GLUCOSE URINE, POC: NORMAL
KETONES, POC: NORMAL
LEUKOCYTE EST, POC: NORMAL
NITRITE, POC: NORMAL
PH, POC: 5
PROTEIN, POC: NORMAL
SPECIFIC GRAVITY, POC: 1.01
UROBILINOGEN, POC: 0.2

## 2019-12-05 PROCEDURE — 93000 ELECTROCARDIOGRAM COMPLETE: CPT | Performed by: INTERNAL MEDICINE

## 2019-12-05 PROCEDURE — 1090F PRES/ABSN URINE INCON ASSESS: CPT | Performed by: NURSE PRACTITIONER

## 2019-12-05 PROCEDURE — 99214 OFFICE O/P EST MOD 30 MIN: CPT | Performed by: NURSE PRACTITIONER

## 2019-12-05 PROCEDURE — G8427 DOCREV CUR MEDS BY ELIG CLIN: HCPCS | Performed by: NURSE PRACTITIONER

## 2019-12-05 PROCEDURE — G8420 CALC BMI NORM PARAMETERS: HCPCS | Performed by: NURSE PRACTITIONER

## 2019-12-05 PROCEDURE — 4040F PNEUMOC VAC/ADMIN/RCVD: CPT | Performed by: NURSE PRACTITIONER

## 2019-12-05 PROCEDURE — 81002 URINALYSIS NONAUTO W/O SCOPE: CPT | Performed by: NURSE PRACTITIONER

## 2019-12-05 PROCEDURE — G8484 FLU IMMUNIZE NO ADMIN: HCPCS | Performed by: NURSE PRACTITIONER

## 2019-12-05 PROCEDURE — 1123F ACP DISCUSS/DSCN MKR DOCD: CPT | Performed by: NURSE PRACTITIONER

## 2019-12-05 PROCEDURE — 1036F TOBACCO NON-USER: CPT | Performed by: NURSE PRACTITIONER

## 2019-12-05 ASSESSMENT — ENCOUNTER SYMPTOMS
VOMITING: 0
SWOLLEN GLANDS: 0
NAUSEA: 0
ABDOMINAL PAIN: 0
COUGH: 0
CHANGE IN BOWEL HABIT: 0
SORE THROAT: 0
VISUAL CHANGE: 1

## 2019-12-06 ENCOUNTER — HOSPITAL ENCOUNTER (OUTPATIENT)
Dept: LAB | Age: 84
Setting detail: SPECIMEN
Discharge: HOME | End: 2019-12-06
Payer: MEDICARE

## 2019-12-06 DIAGNOSIS — R53.83 FATIGUE, UNSPECIFIED TYPE: ICD-10-CM

## 2019-12-06 LAB
C-REACTIVE PROTEIN: 1 MG/L (ref 0–5)
TSH SERPL DL<=0.05 MIU/L-ACNC: 3.46 MIU/L (ref 0.3–5)

## 2019-12-06 PROCEDURE — 86140 C-REACTIVE PROTEIN: CPT

## 2019-12-06 PROCEDURE — 84443 ASSAY THYROID STIM HORMONE: CPT

## 2019-12-06 PROCEDURE — 36415 COLL VENOUS BLD VENIPUNCTURE: CPT

## 2019-12-12 ENCOUNTER — OFFICE VISIT (OUTPATIENT)
Dept: FAMILY MEDICINE CLINIC | Age: 84
End: 2019-12-12
Payer: MEDICARE

## 2019-12-12 VITALS
OXYGEN SATURATION: 99 % | HEIGHT: 63 IN | BODY MASS INDEX: 21.51 KG/M2 | HEART RATE: 59 BPM | SYSTOLIC BLOOD PRESSURE: 138 MMHG | DIASTOLIC BLOOD PRESSURE: 72 MMHG | WEIGHT: 121.4 LBS

## 2019-12-12 DIAGNOSIS — M19.90 ARTHRITIS: ICD-10-CM

## 2019-12-12 DIAGNOSIS — M79.674 PAIN OF TOE OF RIGHT FOOT: Primary | ICD-10-CM

## 2019-12-12 PROCEDURE — G8427 DOCREV CUR MEDS BY ELIG CLIN: HCPCS | Performed by: FAMILY MEDICINE

## 2019-12-12 PROCEDURE — 4040F PNEUMOC VAC/ADMIN/RCVD: CPT | Performed by: FAMILY MEDICINE

## 2019-12-12 PROCEDURE — 1090F PRES/ABSN URINE INCON ASSESS: CPT | Performed by: FAMILY MEDICINE

## 2019-12-12 PROCEDURE — 1123F ACP DISCUSS/DSCN MKR DOCD: CPT | Performed by: FAMILY MEDICINE

## 2019-12-12 PROCEDURE — G8420 CALC BMI NORM PARAMETERS: HCPCS | Performed by: FAMILY MEDICINE

## 2019-12-12 PROCEDURE — G8484 FLU IMMUNIZE NO ADMIN: HCPCS | Performed by: FAMILY MEDICINE

## 2019-12-12 PROCEDURE — 99214 OFFICE O/P EST MOD 30 MIN: CPT | Performed by: FAMILY MEDICINE

## 2019-12-12 PROCEDURE — 1036F TOBACCO NON-USER: CPT | Performed by: FAMILY MEDICINE

## 2019-12-13 ENCOUNTER — HOSPITAL ENCOUNTER (OUTPATIENT)
Dept: LAB | Age: 84
Setting detail: SPECIMEN
Discharge: HOME | End: 2019-12-13
Payer: MEDICARE

## 2019-12-13 LAB
ANION GAP SERPL CALCULATED.3IONS-SCNC: 10 MMOL/L (ref 9–17)
CHLORIDE BLD-SCNC: 103 MMOL/L (ref 98–107)
CO2: 28 MMOL/L (ref 20–31)
HCT VFR BLD CALC: 34.2 % (ref 36–46)
HEMOGLOBIN: 11.3 G/DL (ref 12–16)
MCH RBC QN AUTO: 31 PG (ref 26–34)
MCHC RBC AUTO-ENTMCNC: 33.2 G/DL (ref 31–37)
MCV RBC AUTO: 93.5 FL (ref 80–100)
NRBC AUTOMATED: ABNORMAL PER 100 WBC
PDW BLD-RTO: 14.3 % (ref 11–14.5)
PLATELET # BLD: 225 K/UL (ref 140–450)
PMV BLD AUTO: 8.3 FL (ref 6–12)
POTASSIUM SERPL-SCNC: 4.1 MMOL/L (ref 3.7–5.3)
RBC # BLD: 3.66 M/UL (ref 4–5.2)
SODIUM BLD-SCNC: 141 MMOL/L (ref 135–144)
WBC # BLD: 5.5 K/UL (ref 3.5–11)

## 2019-12-13 PROCEDURE — 36415 COLL VENOUS BLD VENIPUNCTURE: CPT

## 2019-12-13 PROCEDURE — 85027 COMPLETE CBC AUTOMATED: CPT

## 2019-12-13 PROCEDURE — 80051 ELECTROLYTE PANEL: CPT

## 2020-06-24 LAB
ALBUMIN: 4 G/DL (ref 3.2–4.6)
ANION GAP SERPL CALCULATED.3IONS-SCNC: 10 MEQ/L (ref 5–13)
BUN BLDV-MCNC: 14 MG/DL (ref 7–18)
CALCIUM SERPL-MCNC: 9.5 MG/DL (ref 8.4–10.2)
CHLORIDE BLD-SCNC: 106 MMOL/L (ref 98–107)
CO2: 25 MMOL/L (ref 23–31)
CREAT SERPL-MCNC: 1 MG/DL (ref 0.57–1.11)
CREATININE URINE: 47.2 MG/DL (ref 47–110)
GFR CALCULATED: 56
GLUCOSE: 90 MG/DL (ref 70–99)
HEMOGLOBIN: 11.7 G/DL (ref 11.7–14.9)
MAGNESIUM: 1.9 MG/DL (ref 1.6–2.6)
PHOSPHORUS: 3.6 MG/DL (ref 2.5–4.9)
POTASSIUM SERPL-SCNC: 4 MMOL/L (ref 3.5–5.1)
SODIUM BLD-SCNC: 141 MMOL/L (ref 136–145)
TOTAL PROTEIN, URINE: < 6.8 MG/DL

## 2022-01-24 ENCOUNTER — OFFICE VISIT (OUTPATIENT)
Dept: FAMILY MEDICINE CLINIC | Age: 87
End: 2022-01-24
Payer: MEDICARE

## 2022-01-24 VITALS
SYSTOLIC BLOOD PRESSURE: 104 MMHG | OXYGEN SATURATION: 98 % | DIASTOLIC BLOOD PRESSURE: 60 MMHG | WEIGHT: 117.8 LBS | HEART RATE: 56 BPM | BODY MASS INDEX: 21.2 KG/M2 | RESPIRATION RATE: 16 BRPM | TEMPERATURE: 97.6 F

## 2022-01-24 DIAGNOSIS — I11.9 HYPERTENSIVE HEART DISEASE WITHOUT HEART FAILURE: Primary | ICD-10-CM

## 2022-01-24 DIAGNOSIS — Z23 NEED FOR INFLUENZA VACCINATION: ICD-10-CM

## 2022-01-24 DIAGNOSIS — E78.2 MIXED HYPERLIPIDEMIA: ICD-10-CM

## 2022-01-24 DIAGNOSIS — N18.31 STAGE 3A CHRONIC KIDNEY DISEASE (HCC): ICD-10-CM

## 2022-01-24 DIAGNOSIS — Z00.00 ANNUAL PHYSICAL EXAM: ICD-10-CM

## 2022-01-24 DIAGNOSIS — Z13.31 DEPRESSION SCREENING: ICD-10-CM

## 2022-01-24 PROCEDURE — 99214 OFFICE O/P EST MOD 30 MIN: CPT | Performed by: FAMILY MEDICINE

## 2022-01-24 PROCEDURE — 99213 OFFICE O/P EST LOW 20 MIN: CPT

## 2022-01-24 PROCEDURE — 1123F ACP DISCUSS/DSCN MKR DOCD: CPT | Performed by: FAMILY MEDICINE

## 2022-01-24 PROCEDURE — G8484 FLU IMMUNIZE NO ADMIN: HCPCS | Performed by: FAMILY MEDICINE

## 2022-01-24 PROCEDURE — G8427 DOCREV CUR MEDS BY ELIG CLIN: HCPCS | Performed by: FAMILY MEDICINE

## 2022-01-24 PROCEDURE — 4040F PNEUMOC VAC/ADMIN/RCVD: CPT | Performed by: FAMILY MEDICINE

## 2022-01-24 PROCEDURE — G8420 CALC BMI NORM PARAMETERS: HCPCS | Performed by: FAMILY MEDICINE

## 2022-01-24 PROCEDURE — 1090F PRES/ABSN URINE INCON ASSESS: CPT | Performed by: FAMILY MEDICINE

## 2022-01-24 PROCEDURE — 4004F PT TOBACCO SCREEN RCVD TLK: CPT | Performed by: FAMILY MEDICINE

## 2022-01-24 RX ORDER — AMLODIPINE BESYLATE 2.5 MG/1
2.5 TABLET ORAL DAILY
COMMUNITY
Start: 2021-12-31 | End: 2022-10-20 | Stop reason: SINTOL

## 2022-01-24 SDOH — ECONOMIC STABILITY: FOOD INSECURITY: WITHIN THE PAST 12 MONTHS, THE FOOD YOU BOUGHT JUST DIDN'T LAST AND YOU DIDN'T HAVE MONEY TO GET MORE.: NEVER TRUE

## 2022-01-24 SDOH — ECONOMIC STABILITY: FOOD INSECURITY: WITHIN THE PAST 12 MONTHS, YOU WORRIED THAT YOUR FOOD WOULD RUN OUT BEFORE YOU GOT MONEY TO BUY MORE.: NEVER TRUE

## 2022-01-24 ASSESSMENT — PATIENT HEALTH QUESTIONNAIRE - PHQ9
SUM OF ALL RESPONSES TO PHQ QUESTIONS 1-9: 2
SUM OF ALL RESPONSES TO PHQ QUESTIONS 1-9: 2
SUM OF ALL RESPONSES TO PHQ9 QUESTIONS 1 & 2: 2
1. LITTLE INTEREST OR PLEASURE IN DOING THINGS: 1
SUM OF ALL RESPONSES TO PHQ QUESTIONS 1-9: 2
SUM OF ALL RESPONSES TO PHQ QUESTIONS 1-9: 2
2. FEELING DOWN, DEPRESSED OR HOPELESS: 1

## 2022-01-24 ASSESSMENT — SOCIAL DETERMINANTS OF HEALTH (SDOH): HOW HARD IS IT FOR YOU TO PAY FOR THE VERY BASICS LIKE FOOD, HOUSING, MEDICAL CARE, AND HEATING?: NOT HARD AT ALL

## 2022-01-24 ASSESSMENT — ENCOUNTER SYMPTOMS
SHORTNESS OF BREATH: 0
ABDOMINAL PAIN: 0

## 2022-01-24 NOTE — PROGRESS NOTES
Name: Marcela Lees  DOS: 1/24/2022  MRN: [id number]      Subjective:  Marcela Lees is a 80 y.o. female being seen for   Chief Complaint   Patient presents with   Jadyn Siegel New Doctor    Otalgia     patient is here to establish and also has left ear pain.  Hypertension     patient has had an ongoing increase in her bp since december. She did call her cardiologist but could not get into them until february.         Vitals:    01/24/22 0944   BP: 104/60   Pulse: 56   Resp: 16   Temp: 97.6 °F (36.4 °C)   SpO2: 98%     Allergies   Allergen Reactions    Beconase [Beclomethasone]     Benadryl [Diphenhydramine]     Bicillin C-R [Penicillin G Benzathine & Proc]     Chloramphenicols     Ephedrine     Niacin And Related     Other      Flu Vaccine  Pseudophedrine  Chlordrine  Chlorpeniramine    Penicillins     Phenergan [Promethazine Hcl]     Phenylpropanolamine     Propacetamol     Proventil [Albuterol]     Seldane [Terfenadine]     Timoptic [Timolol Maleate]     Triamcinolone      Past Medical History:   Diagnosis Date    Allergic rhinitis     Anxiety     Family history of colon cancer     Glaucoma     History of elevated lipids     History of fatigue     History of mitral valve prolapse     Hyperlipidemia     Hx of    Macular degeneration     Restless legs syndrome      Past Surgical History:   Procedure Laterality Date    BREAST BIOPSY Left     CARPAL TUNNEL RELEASE      COLONOSCOPY  09/08/09    -repeat 5 years    EYE SURGERY  2012    Dr Dana López      x2    TOE SURGERY       Social History     Socioeconomic History    Marital status: Unknown     Spouse name: Not on file    Number of children: Not on file    Years of education: Not on file    Highest education level: Not on file   Occupational History    Not on file   Tobacco Use    Smoking status: Never Smoker    Smokeless tobacco: Never Used   Substance and Sexual Activity    Alcohol use: No    Drug use: No    Sexual activity: Not on file   Other Topics Concern    Not on file   Social History Narrative    Not on file     Social Determinants of Health     Financial Resource Strain: Low Risk     Difficulty of Paying Living Expenses: Not hard at all   Food Insecurity: No Food Insecurity    Worried About Running Out of Food in the Last Year: Never true    [address] in the Last Year: Never true   Transportation Needs:     Lack of Transportation (Medical): Not on file    Lack of Transportation (Non-Medical): Not on file   Physical Activity:     Days of Exercise per Week: Not on file    Minutes of Exercise per Session: Not on file   Stress:     Feeling of Stress : Not on file   Social Connections:     Frequency of Communication with Friends and Family: Not on file    Frequency of Social Gatherings with Friends and Family: Not on file    Attends Tenriism Services: Not on file    Active Member of [address] [place] [address] or Organizations: Not on file    Attends Club or Organization Meetings: Not on file    Marital Status: Not on file   Intimate Partner Violence:     Fear of Current or Ex-Partner: Not on file    Emotionally Abused: Not on file    Physically Abused: Not on file    Sexually Abused: Not on file   Housing Stability:     Unable to Pay for Housing in the Last Year: Not on file    Number of [place] in the Last Year: Not on file    Unstable Housing in the Last Year: Not on file       Current Outpatient Medications   Medication Sig Dispense Refill    amLODIPine (NORVASC) 2.5 MG tablet Take 2.5 mg by mouth daily      GENERIC EXTERNAL MEDICATION daily Patient reports taking Billberry daily      Ascorbic Acid (VITAMIN C) 100 MG tablet Take 100 mg by mouth daily      Multiple Vitamins-Minerals (EYE VITAMINS) TABS Take  by mouth. I CAPS #2 BID      Lutein 40 MG CAPS Take  by mouth. #1tab bid      LUTEIN-ZEAXANTHIN PO Take 4 mg by mouth.  #1 tab BID      ATENOLOL PO Take 25 mg by mouth daily. No current facility-administered medications for this visit. Objective:  Patient is here with er , Shahnaz Mejia. Pt states she feels good today without complaints. Doesn't need refills on any medications. She decliines a colonoscopy as she just doesn't want to do it and is aware of the risks of colon cancer. She does not have copd. Pt. Is under care of Dr. Fernando Duarte nephrologist for CKD. Pt.is under care of Dr. Chacha Teran in 02/2022 for  hypertensive heart disease, mitral valve insufficiency . In September got the flu shot ride aid. Patient has macular degeneration under care Dr. June Berry last seen last week 01/2022    Review of Systems   Constitutional: Negative for fatigue and unexpected weight change. Eyes: Positive for visual disturbance (macular degeneration, needs to see up close B/L). Respiratory: Negative for shortness of breath. Cardiovascular: Negative for chest pain, palpitations and leg swelling. Gastrointestinal: Negative for abdominal pain. Genitourinary: Negative for difficulty urinating. Musculoskeletal: Negative for arthralgias and myalgias. Skin: Negative for rash and wound. Neurological: Negative for dizziness and weakness. Physical Exam  Constitutional:       Appearance: Normal appearance. HENT:      Head: Normocephalic and atraumatic. Right Ear: Tympanic membrane and external ear normal.      Left Ear: Tympanic membrane and external ear normal.      Ears:      Comments: Patient removed both her hearing aids for me to examine her ears B/L normal     Nose: Nose normal.      Mouth/Throat:      Mouth: Mucous membranes are moist.      Pharynx: Oropharynx is clear. Eyes:      Extraocular Movements: Extraocular movements intact. Conjunctiva/sclera: Conjunctivae normal.      Pupils: Pupils are equal, round, and reactive to light. Cardiovascular:      Rate and Rhythm: Normal rate and regular rhythm.       Pulses: Normal pulses. Heart sounds: Normal heart sounds. No murmur heard. Pulmonary:      Effort: Pulmonary effort is normal.      Breath sounds: Normal breath sounds. Abdominal:      General: Abdomen is flat. Bowel sounds are normal. There is no distension. Palpations: Abdomen is soft. There is no mass. Tenderness: There is no abdominal tenderness. There is no guarding. Musculoskeletal:         General: Normal range of motion. Cervical back: Normal range of motion and neck supple. Lymphadenopathy:      Cervical: No cervical adenopathy. Skin:     General: Skin is warm. Capillary Refill: Capillary refill takes less than 2 seconds. Neurological:      General: No focal deficit present. Mental Status: She is alert and oriented to person, place, and time. Psychiatric:         Mood and Affect: Mood normal.         Behavior: Behavior normal.         Thought Content: Thought content normal.          Assessment:   Diagnosis Orders   1. Hypertensive heart disease without heart failure  Comprehensive Metabolic Panel   2. Annual physical exam  CBC Auto Differential    Hemoglobin A1C    TSH without Reflex   3. Stage 3a chronic kidney disease ([facility] [address]     4. Mixed hyperlipidemia  Lipid Panel    Urinalysis Reflex to Culture    Vitamin D 25 Hydroxy   5. Need for influenza vaccination     6. Depression screening           Plan:  Orders Placed This Encounter   Procedures    CBC Auto Differential     Standing Status:   Future     Standing Expiration Date:   3/23/2022    Comprehensive Metabolic Panel     Standing Status:   Future     Standing Expiration Date:   3/23/2022    Hemoglobin A1C     Standing Status:   Future     Standing Expiration Date:   3/23/2022    Lipid Panel     Standing Status:   Future     Standing Expiration Date:   3/23/2022     Order Specific Question:   Is Patient Fasting?/# of Hours     Answer:    Yes    TSH without Reflex     Standing Status:   Future     Standing Expiration Date:   3/23/2022    Urinalysis Reflex to Culture     Standing Status:   Future     Standing Expiration Date:   3/23/2022     Order Specific Question:   SPECIFY(EX-CATH,MIDSTREAM,CYSTO,ETC)? Answer:   midstream    Vitamin D 25 Hydroxy     Standing Status:   Future     Standing Expiration Date:   3/23/2022      Depression screening is performed today. Patient does not have signs or symptoms of acute depression. Low risk for depression at this time. However does get upset that she can not see well due to her macular degeneration. Patient Instructions   Nutrition Health Education:    Keep hydrated, walk 30 minutes minimum 3 times weekly as tolerated. Diet should consist of low fat, low sodium and high fiber. Nutritious foods such as fruits (if you're not diabetic), vegetables, lean meats, lean dairy, whole grains such as brown rice, quinoa, and dry beans. Fabrizio Huang with small amounts of heart healthy extra virgin olive oil. Be watchful of any extra salt/sugar/seasoning to your food. You should eat no more than 2 grams or 2,000 mg of salt daily. Salt will raise your BP. Avoid regular/diet sodas, caffeine, energy drinks as these are full of artificial ingredients/sweeteners, sugar, salt and chemicals that spike insulin and are harmful to your health. Sugar intake increases metabolic disfunction, type 2 diabetes, insulin resistance, addictive food behavior and obesity. Avoid all processed foods, foods from boxes, cans, microwave meals as these contain high salt, sugar or fat content and not much nutrition. Get at least 8 hrs of sleep every night and turn off all electronics at least 1 hour before bedtime as these decreases melatonin production and increases wakefulness. If your cholesterol is high, no greasy, fried, fast or fatty foods. Decrease red meat intake. No butter, deshpande, lard or creams, no milk as these things clog your arteries and leads to heart attacks and death.  If you smoke, smoking increases risk of lung disease, cancers, high BP, heart attack, stroke and death. Take your daily medications as prescribed and inform your family doctor if you are having any side effects or issues taking medications. Elevated Cholesterol:   No greasy, fried, fast, fatty foods   No saturated fats   Decreased red meat intake to once every 2 months   No butter, deshpande nor cream cheese   No egg yolk   NO milk   Decrease your cholesterol in diet             Return in about 1 week (around 1/31/2022) for REVIEW LABS.      Galina Hanna, DO

## 2022-01-24 NOTE — PATIENT INSTRUCTIONS
Nutrition Health Education:    Keep hydrated, walk 30 minutes minimum 3 times weekly as tolerated. Diet should consist of low fat, low sodium and high fiber. Nutritious foods such as fruits (if you're not diabetic), vegetables, lean meats, lean dairy, whole grains such as brown rice, quinoa, and dry beans. Chavarria Sonny with small amounts of heart healthy extra virgin olive oil. Be watchful of any extra salt/sugar/seasoning to your food. You should eat no more than 2 grams or 2,000 mg of salt daily. Salt will raise your BP. Avoid regular/diet sodas, caffeine, energy drinks as these are full of artificial ingredients/sweeteners, sugar, salt and chemicals that spike insulin and are harmful to your health. Sugar intake increases metabolic disfunction, type 2 diabetes, insulin resistance, addictive food behavior and obesity. Avoid all processed foods, foods from boxes, cans, microwave meals as these contain high salt, sugar or fat content and not much nutrition. Get at least 8 hrs of sleep every night and turn off all electronics at least 1 hour before bedtime as these decreases melatonin production and increases wakefulness. If your cholesterol is high, no greasy, fried, fast or fatty foods. Decrease red meat intake. No butter, deshpande, lard or creams, no milk as these things clog your arteries and leads to heart attacks and death. If you smoke, smoking increases risk of lung disease, cancers, high BP, heart attack, stroke and death. Take your daily medications as prescribed and inform your family doctor if you are having any side effects or issues taking medications.     Elevated Cholesterol:   No greasy, fried, fast, fatty foods   No saturated fats   Decreased red meat intake to once every 2 months   No butter, deshpande nor cream cheese   No egg yolk   NO milk   Decrease your cholesterol in diet

## 2022-01-25 DIAGNOSIS — I11.9 HYPERTENSIVE HEART DISEASE WITHOUT HEART FAILURE: ICD-10-CM

## 2022-01-25 DIAGNOSIS — Z00.00 ANNUAL PHYSICAL EXAM: ICD-10-CM

## 2022-01-25 DIAGNOSIS — E78.2 MIXED HYPERLIPIDEMIA: ICD-10-CM

## 2022-01-28 DIAGNOSIS — R82.71 BACTERIA IN URINE: Primary | ICD-10-CM

## 2022-02-03 DIAGNOSIS — R82.71 BACTERIA IN URINE: ICD-10-CM

## 2022-02-11 ENCOUNTER — OFFICE VISIT (OUTPATIENT)
Dept: FAMILY MEDICINE CLINIC | Age: 87
End: 2022-02-11
Payer: MEDICARE

## 2022-02-11 VITALS
RESPIRATION RATE: 16 BRPM | OXYGEN SATURATION: 98 % | SYSTOLIC BLOOD PRESSURE: 122 MMHG | TEMPERATURE: 98.3 F | WEIGHT: 115.2 LBS | HEART RATE: 64 BPM | DIASTOLIC BLOOD PRESSURE: 70 MMHG | HEIGHT: 63 IN | BODY MASS INDEX: 20.41 KG/M2

## 2022-02-11 DIAGNOSIS — E78.2 MIXED HYPERLIPIDEMIA: Primary | ICD-10-CM

## 2022-02-11 DIAGNOSIS — I10 BENIGN ESSENTIAL HTN: ICD-10-CM

## 2022-02-11 PROCEDURE — 99213 OFFICE O/P EST LOW 20 MIN: CPT | Performed by: FAMILY MEDICINE

## 2022-02-11 PROCEDURE — G8484 FLU IMMUNIZE NO ADMIN: HCPCS | Performed by: FAMILY MEDICINE

## 2022-02-11 PROCEDURE — G8427 DOCREV CUR MEDS BY ELIG CLIN: HCPCS | Performed by: FAMILY MEDICINE

## 2022-02-11 PROCEDURE — 4004F PT TOBACCO SCREEN RCVD TLK: CPT | Performed by: FAMILY MEDICINE

## 2022-02-11 PROCEDURE — 1090F PRES/ABSN URINE INCON ASSESS: CPT | Performed by: FAMILY MEDICINE

## 2022-02-11 PROCEDURE — 99212 OFFICE O/P EST SF 10 MIN: CPT | Performed by: FAMILY MEDICINE

## 2022-02-11 PROCEDURE — 1123F ACP DISCUSS/DSCN MKR DOCD: CPT | Performed by: FAMILY MEDICINE

## 2022-02-11 PROCEDURE — 4040F PNEUMOC VAC/ADMIN/RCVD: CPT | Performed by: FAMILY MEDICINE

## 2022-02-11 PROCEDURE — G8420 CALC BMI NORM PARAMETERS: HCPCS | Performed by: FAMILY MEDICINE

## 2022-02-11 ASSESSMENT — ENCOUNTER SYMPTOMS
ABDOMINAL PAIN: 0
SHORTNESS OF BREATH: 0
COUGH: 0
WHEEZING: 0

## 2022-02-11 NOTE — PROGRESS NOTES
Name: Mike Mcmahan  DOS: 2/11/2022  MRN: [id number]      Subjective:  Mike Mcmahan is a 80 y.o. female being seen for   Chief Complaint   Patient presents with   Svetlana Current Results     Lab results.  Hypertensive heart disease, copd, hyperlipidemia- last lipids 1/24/2022       Vitals:    02/11/22 0842   BP: 122/70   Pulse: 64   Resp: 16   Temp: 98.3 °F (36.8 °C)   SpO2: 98%     Allergies   Allergen Reactions    Beconase [Beclomethasone]     Benadryl [Diphenhydramine]     Bicillin C-R [Penicillin G Benzathine & Proc]     Chloramphenicols     Ephedrine     Niacin And Related     Other      Flu Vaccine  Pseudophedrine  Chlordrine  Chlorpeniramine    Penicillins     Phenergan [Promethazine Hcl]     Phenylpropanolamine     Propacetamol     Proventil [Albuterol]     Seldane [Terfenadine]     Timoptic [Timolol Maleate]     Triamcinolone      Past Medical History:   Diagnosis Date    Allergic rhinitis     Anxiety     Family history of colon cancer     Glaucoma     History of elevated lipids     History of fatigue     History of mitral valve prolapse     Hyperlipidemia     Hx of    Macular degeneration     Restless legs syndrome      Past Surgical History:   Procedure Laterality Date    BREAST BIOPSY Left     CARPAL TUNNEL RELEASE      COLONOSCOPY  09/08/09    -repeat 5 years    EYE SURGERY  2012    Dr Astrid Torres      x2    TOE SURGERY       Social History     Socioeconomic History    Marital status: Unknown     Spouse name: Not on file    Number of children: Not on file    Years of education: Not on file    Highest education level: Not on file   Occupational History    Not on file   Tobacco Use    Smoking status: Never Smoker    Smokeless tobacco: Never Used   Substance and Sexual Activity    Alcohol use: No    Drug use: No    Sexual activity: Not on file   Other Topics Concern    Not on file   Social History Narrative    Not on file Social Determinants of Health     Financial Resource Strain: Low Risk     Difficulty of Paying Living Expenses: Not hard at all   Food Insecurity: No Food Insecurity    Worried About Running Out of Food in the Last Year: Never true    [place] of Food in the Last Year: Never true   Transportation Needs:     Lack of Transportation (Medical): Not on file    Lack of Transportation (Non-Medical): Not on file   Physical Activity:     Days of Exercise per Week: Not on file    Minutes of Exercise per Session: Not on file   Stress:     Feeling of Stress : Not on file   Social Connections:     Frequency of Communication with Friends and Family: Not on file    Frequency of Social Gatherings with Friends and Family: Not on file    Attends Protestant Services: Not on file    Active Member of [address] [place] [address] or Organizations: Not on file    Attends Club or Organization Meetings: Not on file    Marital Status: Not on file   Intimate Partner Violence:     Fear of Current or Ex-Partner: Not on file    Emotionally Abused: Not on file    Physically Abused: Not on file    Sexually Abused: Not on file   Housing Stability:     Unable to Pay for Housing in the Last Year: Not on file    Number of [place] in the Last Year: Not on file    Unstable Housing in the Last Year: Not on file       Current Outpatient Medications   Medication Sig Dispense Refill    amLODIPine (NORVASC) 2.5 MG tablet Take 2.5 mg by mouth daily      GENERIC EXTERNAL MEDICATION daily Patient reports taking Billberry daily      Ascorbic Acid (VITAMIN C) 100 MG tablet Take 100 mg by mouth daily      Multiple Vitamins-Minerals (EYE VITAMINS) TABS Take  by mouth. I CAPS #2 BID      Lutein 40 MG CAPS Take  by mouth. #1tab bid      LUTEIN-ZEAXANTHIN PO Take 4 mg by mouth. #1 tab BID      ATENOLOL PO Take 25 mg by mouth daily. No current facility-administered medications for this visit.         Objective:  Pt states she is a worrier which makes her depressed but she is no suicidal. Been like this for years. She decides to not try to take anything after she discusses it. Although the pt knows she has high cholesterol she will never take cholesterol medications. Eats fried meat and potatoes. Fried ham, sausage and martinez fried. Saw Dr. Jessica Gill, cardio, yesterday he tld her to continue her medications. Patient will not quit eating the high cholesterol foods. Review of Systems   Constitutional: Negative for fatigue and unexpected weight change. Eyes: Positive for visual disturbance (can not see uch because of macular degeneration). Respiratory: Negative for cough, shortness of breath and wheezing. Cardiovascular: Negative for chest pain, palpitations and leg swelling. Gastrointestinal: Negative for abdominal pain. Genitourinary: Negative for difficulty urinating. Musculoskeletal: Negative for arthralgias and myalgias. Skin: Negative for rash and wound. Neurological: Negative for dizziness, weakness and light-headedness. Hematological: Does not bruise/bleed easily. Psychiatric/Behavioral: Negative for suicidal ideas. The patient is nervous/anxious (a little nervous and anxious). Physical Exam  Constitutional:       Appearance: Normal appearance. HENT:      Head: Normocephalic and atraumatic. Right Ear: Tympanic membrane and external ear normal.      Left Ear: Tympanic membrane and external ear normal.      Nose: Nose normal.      Mouth/Throat:      Mouth: Mucous membranes are moist.      Pharynx: Oropharynx is clear. Eyes:      Extraocular Movements: Extraocular movements intact. Conjunctiva/sclera: Conjunctivae normal.      Pupils: Pupils are equal, round, and reactive to light. Cardiovascular:      Rate and Rhythm: Normal rate and regular rhythm. Pulses: Normal pulses. Heart sounds: Normal heart sounds. No murmur heard.       Pulmonary:      Effort: Pulmonary effort is normal.      Breath sounds: Normal breath sounds. Abdominal:      General: Abdomen is flat. Bowel sounds are normal. There is no distension. Palpations: Abdomen is soft. There is no mass. Tenderness: There is no abdominal tenderness. There is no guarding. Musculoskeletal:         General: Normal range of motion. Cervical back: Normal range of motion and neck supple. Lymphadenopathy:      Cervical: No cervical adenopathy. Skin:     General: Skin is warm. Capillary Refill: Capillary refill takes less than 2 seconds. Neurological:      General: No focal deficit present. Mental Status: She is alert and oriented to person, place, and time. Psychiatric:         Mood and Affect: Mood normal.         Behavior: Behavior normal.         Thought Content: Thought content normal.          Assessment:   Diagnosis Orders   1. Mixed hyperlipidemia  Lipid Panel    Comprehensive Metabolic Panel   2. Benign essential HTN  Lipid Panel    Comprehensive Metabolic Panel         Plan:  Orders Placed This Encounter   Procedures    Lipid Panel     Standing Status:   Future     Standing Expiration Date:   6/11/2022     Order Specific Question:   Is Patient Fasting?/# of Hours     Answer: Yes    Comprehensive Metabolic Panel     Standing Status:   Future     Standing Expiration Date:   6/11/2022         Patient Instructions   Elevated Cholesterol:   No greasy, fried, fast, fatty foods   No saturated fats   Decreased red meat intake to once every 2 months   No butter, deshpande nor cream cheese   No egg yolk   NO milk   Decrease your cholesterol in diet        Nutrition Health Education:    Keep hydrated, walk 30 minutes minimum 3 times weekly as tolerated. Diet should consist of low fat, low sodium and high fiber. Nutritious foods such as fruits (if you're not diabetic), vegetables, lean meats, lean dairy, whole grains such as brown rice, quinoa, and dry beans. Ok Kady with small amounts of heart healthy extra virgin olive oil.  Be

## 2022-02-11 NOTE — PATIENT INSTRUCTIONS
Elevated Cholesterol:   No greasy, fried, fast, fatty foods   No saturated fats   Decreased red meat intake to once every 2 months   No butter, deshpande nor cream cheese   No egg yolk   NO milk   Decrease your cholesterol in diet        Nutrition Health Education:    Keep hydrated, walk 30 minutes minimum 3 times weekly as tolerated. Diet should consist of low fat, low sodium and high fiber. Nutritious foods such as fruits (if you're not diabetic), vegetables, lean meats, lean dairy, whole grains such as brown rice, quinoa, and dry beans. Van Late with small amounts of heart healthy extra virgin olive oil. Be watchful of any extra salt/sugar/seasoning to your food. You should eat no more than 2 grams or 2,000 mg of salt daily. Salt will raise your BP. Avoid regular/diet sodas, caffeine, energy drinks as these are full of artificial ingredients/sweeteners, sugar, salt and chemicals that spike insulin and are harmful to your health. Sugar intake increases metabolic disfunction, type 2 diabetes, insulin resistance, addictive food behavior and obesity. Avoid all processed foods, foods from boxes, cans, microwave meals as these contain high salt, sugar or fat content and not much nutrition. Get at least 8 hrs of sleep every night and turn off all electronics at least 1 hour before bedtime as these decreases melatonin production and increases wakefulness. If your cholesterol is high, no greasy, fried, fast or fatty foods. Decrease red meat intake. No butter, deshpande, lard or creams, no milk as these things clog your arteries and leads to heart attacks and death. If you smoke, smoking increases risk of lung disease, cancers, high BP, heart attack, stroke and death. Take your daily medications as prescribed and inform your family doctor if you are having any side effects or issues taking medications. Patient is to continue on the rest of their current medical regimen. No additional changes are made.

## 2022-02-23 PROBLEM — Z13.31 DEPRESSION SCREENING: Status: RESOLVED | Noted: 2022-01-24 | Resolved: 2022-02-23

## 2022-05-04 LAB
ALBUMIN/GLOBULIN RATIO: 1.5 G/DL
ALBUMIN: 3.8 G/DL (ref 3.5–5)
ALP BLD-CCNC: 59 UNITS/L (ref 38–126)
ALT SERPL-CCNC: 12 UNITS/L (ref 4–35)
ANION GAP SERPL CALCULATED.3IONS-SCNC: 4.1 MMOL/L
AST SERPL-CCNC: 27 UNITS/L (ref 14–36)
BILIRUB SERPL-MCNC: 0.5 MG/DL (ref 0.2–1.3)
BUN BLDV-MCNC: 22 MG/DL (ref 7–17)
CALCIUM SERPL-MCNC: 9.4 MG/DL (ref 8.4–10.2)
CHLORIDE BLD-SCNC: 108 MMOL/L (ref 98–120)
CHOLESTEROL/HDL RATIO: 4.37 RATIO (ref 0–4.5)
CHOLESTEROL: 223 MG/DL (ref 50–200)
CO2: 28 MMOL/L (ref 22–31)
CREAT SERPL-MCNC: 1.1 MG/DL (ref 0.5–1)
GFR CALCULATED: 49.8
GLOBULIN: 2.6 G/DL
GLUCOSE: 89 MG/DL (ref 65–105)
HDLC SERPL-MCNC: 51 MG/DL (ref 36–68)
LDL CHOLESTEROL CALCULATED: 149 MG/DL (ref 0–160)
POTASSIUM SERPL-SCNC: 5 MMOL/L (ref 3.6–5)
SODIUM BLD-SCNC: 140 MMOL/L (ref 135–145)
TOTAL PROTEIN, SERUM: 6.4 G/DL (ref 6.3–8.2)
TRIGL SERPL-MCNC: 115 MG/DL (ref 10–250)
VLDLC SERPL CALC-MCNC: 23 MG/DL (ref 0–50)

## 2022-05-11 ENCOUNTER — OFFICE VISIT (OUTPATIENT)
Dept: FAMILY MEDICINE CLINIC | Age: 87
End: 2022-05-11
Payer: MEDICARE

## 2022-05-11 VITALS
BODY MASS INDEX: 20.7 KG/M2 | OXYGEN SATURATION: 99 % | RESPIRATION RATE: 16 BRPM | TEMPERATURE: 96.9 F | DIASTOLIC BLOOD PRESSURE: 60 MMHG | HEART RATE: 63 BPM | WEIGHT: 115 LBS | SYSTOLIC BLOOD PRESSURE: 120 MMHG

## 2022-05-11 DIAGNOSIS — Z00.00 INITIAL MEDICARE ANNUAL WELLNESS VISIT: Primary | ICD-10-CM

## 2022-05-11 PROCEDURE — G0438 PPPS, INITIAL VISIT: HCPCS | Performed by: FAMILY MEDICINE

## 2022-05-11 PROCEDURE — 1123F ACP DISCUSS/DSCN MKR DOCD: CPT | Performed by: FAMILY MEDICINE

## 2022-05-11 PROCEDURE — 4040F PNEUMOC VAC/ADMIN/RCVD: CPT | Performed by: FAMILY MEDICINE

## 2022-05-11 PROCEDURE — 99213 OFFICE O/P EST LOW 20 MIN: CPT | Performed by: FAMILY MEDICINE

## 2022-05-11 RX ORDER — LOSARTAN POTASSIUM 25 MG/1
TABLET ORAL
COMMUNITY
Start: 2022-04-26 | End: 2022-10-20 | Stop reason: SDUPTHER

## 2022-05-11 ASSESSMENT — PATIENT HEALTH QUESTIONNAIRE - PHQ9
SUM OF ALL RESPONSES TO PHQ QUESTIONS 1-9: 0
SUM OF ALL RESPONSES TO PHQ QUESTIONS 1-9: 0
2. FEELING DOWN, DEPRESSED OR HOPELESS: 0
SUM OF ALL RESPONSES TO PHQ9 QUESTIONS 1 & 2: 0
SUM OF ALL RESPONSES TO PHQ QUESTIONS 1-9: 0
1. LITTLE INTEREST OR PLEASURE IN DOING THINGS: 0
SUM OF ALL RESPONSES TO PHQ QUESTIONS 1-9: 0

## 2022-05-11 ASSESSMENT — LIFESTYLE VARIABLES: HOW OFTEN DO YOU HAVE A DRINK CONTAINING ALCOHOL: NEVER

## 2022-05-11 NOTE — PROGRESS NOTES
Medicare Annual Wellness Visit    Dee Adames is here for Medicare AWV    Assessment & Plan   Initial Medicare annual wellness visit      Recommendations for Preventive Services Due: see orders and patient instructions/AVS.  Recommended screening schedule for the next 5-10 years is provided to the patient in written form: see Patient Instructions/AVS.     Return for Medicare Annual Wellness Visit in 1 year. Subjective   The following acute and/or chronic problems were also addressed today:  Cholesterol and CKD    Patient's complete Health Risk Assessment and screening values have been reviewed and are found in Flowsheets. The following problems were reviewed today and where indicated follow up appointments were made and/or referrals ordered.     Positive Risk Factor Screenings with Interventions:    Fall Risk:  Do you feel unsteady or are you worried about falling? : (!) yes  2 or more falls in past year?: (!) yes  Fall with injury in past year?: no     Fall Risk Interventions:    · pt says she is safe at home              Leeanna Moya and ACP:  General  In general, how would you say your health is?: Good  In the past 7 days, have you experienced any of the following: New or Increased Pain, New or Increased Fatigue, Loneliness, Social Isolation, Stress or Anger?: (!) Yes  Select all that apply: (!) New or Increased Pain  Do you get the social and emotional support that you need?: Yes  Do you have a Living Will?: (!) No    Advance Directives     Power of  Living Will ACP-Advance Directive ACP-Power of     Not on File Not on File Not on File Not on File      General Health Risk Interventions:  · yes she has a living will     Hearing/Vision:  Do you or your family notice any trouble with your hearing that hasn't been managed with hearing aids?: (!) Yes  Do you have difficulty driving, watching TV, or doing any of your daily activities because of your eyesight?: (!) Yes  Have you had an eye exam within the past year?: Yes  No exam data present    Hearing/Vision Interventions:  · her batteries went out of her hearing aids as she was sitting hiere so she will change it at home today    Safety:  Do you have working smoke detectors?: Yes  Do you have any tripping hazards - loose or unsecured carpets or rugs?: No  Do you have any tripping hazards - clutter in doorways, halls, or stairs?: No  Do you have either shower bars, grab bars, non-slip mats or non-slip surfaces in your shower or bathtub?: (!) No  Do all of your stairways have a railing or banister?: Not Applicable  Do you always fasten your seatbelt when you are in a car?: Yes    Safety Interventions:  · Pt is safe at home    ADLs:  In the past 7 days, did you need help from others to perform any of the following everyday activities: Eating, dressing, grooming, bathing, toileting, or walking/balance?: No  In the past 7 days, did you need help from others to take care of any of the following: Laundry, housekeeping, banking/finances, shopping, telephone use, food preparation, transportation, or taking medications?: (!) Yes    ADL Interventions:  · Pt says she doesnt need any help as he  helps her with all of this          Objective   Vitals:    05/11/22 0855 05/11/22 0912   BP: (!) 140/87 120/60   Site: Right Upper Arm Left Upper Arm   Position: Sitting Sitting   Cuff Size: Medium Adult    Pulse: 63    Resp: 16    Temp: 96.9 °F (36.1 °C)    SpO2: 99%    Weight: 115 lb (52.2 kg)       Body mass index is 20.7 kg/m².                Allergies   Allergen Reactions    Beconase [Beclomethasone]     Benadryl [Diphenhydramine]     Bicillin C-R [Penicillin G Benzathine & Proc]     Chloramphenicols     Ephedrine     Niacin And Related     Other      Flu Vaccine  Pseudophedrine  Chlordrine  Chlorpeniramine    Penicillins     Phenergan [Promethazine Hcl]     Phenylpropanolamine     Propacetamol     Proventil [Albuterol]     Seldane [Terfenadine]     Timoptic [Timolol Maleate]     Triamcinolone      Prior to Visit Medications    Medication Sig Taking? Authorizing Provider   losartan (COZAAR) 25 MG tablet TAKE 1 TABLET BY MOUTH IN THE MORNING Yes Historical Provider, MD   amLODIPine (NORVASC) 2.5 MG tablet Take 2.5 mg by mouth daily Yes Historical Provider, MD Mcrae daily Patient reports taking Billberry daily Yes Historical Provider, MD   Ascorbic Acid (VITAMIN C) 100 MG tablet Take 100 mg by mouth daily Yes Historical Provider, MD   Multiple Vitamins-Minerals (EYE VITAMINS) TABS Take  by mouth. I CAPS #2 BID Yes Historical Provider, MD   Lutein 40 MG CAPS Take  by mouth. #1tab bid Yes Historical Provider, MD   LUTEIN-ZEAXANTHIN PO Take 4 mg by mouth. #1 tab BID Yes Historical Provider, MD   ATENOLOL PO Take 25 mg by mouth daily.  Yes Historical Provider, MD Mcclain (Including outside providers/suppliers regularly involved in providing care):   Patient Care Team:  Francois Fontana DO as PCP - General (Family Medicine)  Francois Fontana DO as PCP - [facility] Empaneled Provider  Salena Zhang MD (Ophthalmology)  Sonny Mobley MD (Ophthalmology)  Jessica Lara MD as Consulting Physician (Internal Medicine Cardiovascular Disease)    Reviewed and updated this visit:  Tobacco  Allergies  Meds  Med Hx  Surg Hx  Soc Hx  Fam Hx

## 2022-05-11 NOTE — PATIENT INSTRUCTIONS
Personalized Preventive Plan for Marcelino Wiggins - 5/11/2022  Medicare offers a range of preventive health benefits. Some of the tests and screenings are paid in full while other may be subject to a deductible, co-insurance, and/or copay. Some of these benefits include a comprehensive review of your medical history including lifestyle, illnesses that may run in your family, and various assessments and screenings as appropriate. After reviewing your medical record and screening and assessments performed today your provider may have ordered immunizations, labs, imaging, and/or referrals for you. A list of these orders (if applicable) as well as your Preventive Care list are included within your After Visit Summary for your review. Other Preventive Recommendations:    · A preventive eye exam performed by an eye specialist is recommended every 1-2 years to screen for glaucoma; cataracts, macular degeneration, and other eye disorders. · A preventive dental visit is recommended every 6 months. · Try to get at least 150 minutes of exercise per week or 10,000 steps per day on a pedometer . · Order or download the FREE \"Exercise & Physical Activity: Your Everyday Guide\" from [employer]. Call 2-952.799.5866 or search [employer] online. · You need 7398-8754 mg of calcium and 0626-3864 IU of vitamin D per day. It is possible to meet your calcium requirement with diet alone, but a vitamin D supplement is usually necessary to meet this goal.  · When exposed to the sun, use a sunscreen that protects against both UVA and UVB radiation with an SPF of 30 or greater. Reapply every 2 to 3 hours or after sweating, drying off with a towel, or swimming. · Always wear a seat belt when traveling in a car. Always wear a helmet when riding a bicycle or motorcycle. Nutrition Health Education:    Keep hydrated, walk 30 minutes minimum 3 times weekly as tolerated.  Diet should consist of low fat, low sodium and high fiber. Nutritious foods such as fruits (if you're not diabetic), vegetables, lean meats, lean dairy, whole grains such as brown rice, quinoa, and dry beans. Vallorie Mater with small amounts of heart healthy extra virgin olive oil. Be watchful of any extra salt/sugar/seasoning to your food. You should eat no more than 2 grams or 2,000 mg of salt daily. Salt will raise your BP. Avoid regular/diet sodas, caffeine, energy drinks as these are full of artificial ingredients/sweeteners, sugar, salt and chemicals that spike insulin and are harmful to your health. Sugar intake increases metabolic disfunction, type 2 diabetes, insulin resistance, addictive food behavior and obesity. Avoid all processed foods, foods from boxes, cans, microwave meals as these contain high salt, sugar or fat content and not much nutrition. Get at least 8 hrs of sleep every night and turn off all electronics at least 1 hour before bedtime as these decreases melatonin production and increases wakefulness. If your cholesterol is high, no greasy, fried, fast or fatty foods. Decrease red meat intake. No butter, deshpande, lard or creams, no milk as these things clog your arteries and leads to heart attacks and death. If you smoke, smoking increases risk of lung disease, cancers, high BP, heart attack, stroke and death. Take your daily medications as prescribed and inform your family doctor if you are having any side effects or issues taking medications.   Elevated Blood Pressure:   No caffeine   No fast foods   Decrease salt in diet (consume nothing in a can, nothing in a box as these things contain high sodium)   No energy drinks   Buy a BP cuff and take blood pressure 3 times a day and write down blood pressure and pulse and bring in to me when you RTO   Lose weight and increase exercise   Start only walking then may advance to brisk walking and lift low poundage free weights    Pt is under care of nephrologist  She doesn't want me to do any BW as her nephrologist does it  She doesn't want any cholesterol medications    Rechecked her BP    Follow up in 4 months BP check

## 2022-05-24 LAB
ANION GAP SERPL CALCULATED.3IONS-SCNC: 7.1 MMOL/L
BUN BLDV-MCNC: 23 MG/DL (ref 7–17)
CALCIUM SERPL-MCNC: 9.3 MG/DL (ref 8.4–10.2)
CHLORIDE BLD-SCNC: 105 MMOL/L (ref 98–120)
CO2: 28 MMOL/L (ref 22–31)
CREAT SERPL-MCNC: 1.1 MG/DL (ref 0.5–1)
GFR CALCULATED: 49.8
GLUCOSE: 91 MG/DL (ref 65–105)
POTASSIUM SERPL-SCNC: 4.3 MMOL/L (ref 3.6–5)
SODIUM BLD-SCNC: 140 MMOL/L (ref 135–145)

## 2022-07-27 ENCOUNTER — OFFICE VISIT (OUTPATIENT)
Dept: PODIATRY | Age: 87
End: 2022-07-27
Payer: MEDICARE

## 2022-07-27 VITALS
SYSTOLIC BLOOD PRESSURE: 118 MMHG | DIASTOLIC BLOOD PRESSURE: 62 MMHG | HEART RATE: 64 BPM | BODY MASS INDEX: 20.09 KG/M2 | WEIGHT: 111.6 LBS | RESPIRATION RATE: 20 BRPM

## 2022-07-27 DIAGNOSIS — N18.9 CHRONIC KIDNEY DISEASE, UNSPECIFIED CKD STAGE: Primary | ICD-10-CM

## 2022-07-27 DIAGNOSIS — B35.1 DERMATOPHYTOSIS OF NAIL: ICD-10-CM

## 2022-07-27 DIAGNOSIS — L60.2 OG (ONYCHOGRYPHOSIS): ICD-10-CM

## 2022-07-27 PROCEDURE — 1090F PRES/ABSN URINE INCON ASSESS: CPT | Performed by: PODIATRIST

## 2022-07-27 PROCEDURE — G8420 CALC BMI NORM PARAMETERS: HCPCS | Performed by: PODIATRIST

## 2022-07-27 PROCEDURE — 1036F TOBACCO NON-USER: CPT | Performed by: PODIATRIST

## 2022-07-27 PROCEDURE — G8427 DOCREV CUR MEDS BY ELIG CLIN: HCPCS | Performed by: PODIATRIST

## 2022-07-27 PROCEDURE — 99203 OFFICE O/P NEW LOW 30 MIN: CPT | Performed by: PODIATRIST

## 2022-07-27 PROCEDURE — 1123F ACP DISCUSS/DSCN MKR DOCD: CPT | Performed by: PODIATRIST

## 2022-07-27 PROCEDURE — 11721 DEBRIDE NAIL 6 OR MORE: CPT | Performed by: PODIATRIST

## 2022-07-27 NOTE — PROGRESS NOTES
Foot Care Worksheet  PCP: Caden Jamil,   Last visit: 05/11/2022    Nail description:  Thick , Yellow , Crumbly , Marked limitation of ambulation     Pain resulting from thickened and dystrophy of nail plate Yes    Nails involved  Right   1, 2, 3, 4, 5  (T5-T9)  Left     1, 5  (TA-T4)    Q7 1 Class A Finding - Non traumatic amputation of foot No    Q8 2 Class B Findings - Absent DP pulse No, Absent PT pulse No, Advanced tropic changes (3 required) Yes    Decrease hair growth Yes, Nail changes/thickening Yes, Pigmented changes/discoloration Yes, Skin texture (thin, shiny) No, Skin color (rubor/redness) No    Q9 1 Class B and 2 Class C Findings  Claudication No, Temperature change Yes, Paresthesia No, Burning No, Edema Yes

## 2022-07-27 NOTE — PROGRESS NOTES
Subjective:  Ugo Medeiros is a 80 y.o. female who presents to the office today complaining of thick sore nails. Symptoms began  year(s) ago. Patient relates pain is Present. Pain is rated 3 out of 10 and is described as intermittent. Treatments prior to today's visit include: none recent. Currently denies F/C/N/V. Allergies   Allergen Reactions    Beconase [Beclomethasone]     Benadryl [Diphenhydramine]     Bicillin C-R [Penicillin G Benzathine & Proc]     Chloramphenicols     Ephedrine     Niacin And Related     Other      Flu Vaccine  Pseudophedrine  Chlordrine  Chlorpeniramine    Penicillins     Phenergan [Promethazine Hcl]     Phenylpropanolamine     Propacetamol     Proventil [Albuterol]     Seldane [Terfenadine]     Timoptic [Timolol Maleate]     Triamcinolone        Past Medical History:   Diagnosis Date    Allergic rhinitis     Anxiety     Family history of colon cancer     Glaucoma     History of elevated lipids     History of fatigue     History of mitral valve prolapse     Hyperlipidemia     Hx of    Macular degeneration     Restless legs syndrome        Prior to Admission medications    Medication Sig Start Date End Date Taking? Authorizing Provider   losartan (COZAAR) 25 MG tablet TAKE 1 TABLET BY MOUTH IN THE MORNING 4/26/22  Yes Historical Provider, MD   amLODIPine (NORVASC) 2.5 MG tablet Take 2.5 mg by mouth daily 12/31/21  Yes Historical Provider, MD Mcrae daily Patient reports taking Billberry daily   Yes Historical Provider, MD   Ascorbic Acid (VITAMIN C) 100 MG tablet Take 100 mg by mouth daily   Yes Historical Provider, MD   Multiple Vitamins-Minerals (EYE VITAMINS) TABS Take  by mouth. I CAPS #2 BID   Yes Historical Provider, MD   Lutein 40 MG CAPS Take  by mouth. #1tab bid   Yes Historical Provider, MD   LUTEIN-ZEAXANTHIN PO Take 4 mg by mouth. #1 tab BID   Yes Historical Provider, MD   ATENOLOL PO Take 25 mg by mouth daily.    Yes Historical Provider, MD Past Surgical History:   Procedure Laterality Date    BREAST BIOPSY Left     CARPAL TUNNEL RELEASE      COLONOSCOPY  09/08/09    -repeat 5 years    EYE SURGERY  2012    Dr Marina Zapata (CERVIX STATUS UNKNOWN)      SINUS SURGERY      x2    TOE SURGERY         Family History   Problem Relation Age of Onset    Cancer Brother         colon       Social History     Tobacco Use    Smoking status: Never    Smokeless tobacco: Never   Substance Use Topics    Alcohol use: No       ROS: All 14 ROS systems reviewed and pertinent positives noted above, all others negative. Lower Extremity Physical Examination:     Vitals:   Vitals:    07/27/22 1500   BP: 118/62   Pulse: 64   Resp: 20     General: AAO x 3 in NAD. Vascular: DP and PT pulses palpable 2/4, bilateral.  CFT <3 seconds, bilateral.  Hair growth absent to the level of the digits, bilateral.  Edema present, bilateral.  Varicosities present, bilateral. Erythema absent, bilateral. Distal Rubor absent bilateral.  Temperature decreased bilateral. Hyperpigmentation present bilateral. Atrophic skin yes. Neurological: Sensation intact to light touch to level of digits, bilateral.  Protective sensation intact 10/10 sites via 5.07/10g Ada-Roselyn Monofilament, bilateral.  negative Tinel's, bilateral.  negative Valleix sign, bilateral.  Vibratory intact bilateral.  Reflexes Decreased bilateral.  Paresthesias negative. Dysthesias negative. Sharp/dull intact bilateral.      Musculoskeletal: Muscle strength 5/5, Bilateral.  Pain present upon palpation of b/l hallux, Bilateral. within normal limits medial longitudinal arch, Bilateral.  Ankle ROM decreased,Bilateral.  1st MPJ ROM within normal limits, Bilateral.  Dorsally contracted digits absent digits none, Bilateral. Other foot deformities none.     Integument: Warm, dry, supple, Bilateral.  Open lesion absent, Bilateral.  Interdigital maceration absent to web spaces absent, Bilateral.  Nails left 1, 5 and right 1, 2, 3, 4, 5 thickened, dystrophic and crumbly, discolored with subungual debris. OG b/l hallux. Fissures absent, Bilateral. Hyperkeratotic tissue is absent. Asessment: Patient is a 80 y.o. female with:    Diagnosis Orders   1. Chronic kidney disease, unspecified CKD stage        2. Dermatophytosis of nail        3. OG (onychogryphosis)            Plan: Patient examined and evaluated. Current condition and treatment options discussed in detail. All nails as mentioned above debrided in length and thickness. Pt given tx options for anti fungal therapy. Pt educated on Rx po lamisil, Rx topical Penlac, OTC home remedies or other OTC topical meds. Patient advised OTC methods for treatment of the mycotic nails. Patient will follow up in 10 weeks. Patient is low level medical decision making. Patient stable chronic condition. No prescriptions or testing. Low risk morbidity  Contact office with any questions/problems/concerns. RTC in 10week(s).

## 2022-10-20 ENCOUNTER — OFFICE VISIT (OUTPATIENT)
Dept: FAMILY MEDICINE CLINIC | Age: 87
End: 2022-10-20
Payer: MEDICARE

## 2022-10-20 VITALS
DIASTOLIC BLOOD PRESSURE: 78 MMHG | RESPIRATION RATE: 16 BRPM | WEIGHT: 114 LBS | HEART RATE: 78 BPM | SYSTOLIC BLOOD PRESSURE: 132 MMHG | OXYGEN SATURATION: 95 % | BODY MASS INDEX: 20.52 KG/M2

## 2022-10-20 DIAGNOSIS — N18.31 STAGE 3A CHRONIC KIDNEY DISEASE (HCC): ICD-10-CM

## 2022-10-20 DIAGNOSIS — H35.3230 BILATERAL EXUDATIVE AGE-RELATED MACULAR DEGENERATION, UNSPECIFIED STAGE (HCC): ICD-10-CM

## 2022-10-20 DIAGNOSIS — I10 PRIMARY HYPERTENSION: Primary | ICD-10-CM

## 2022-10-20 PROBLEM — N18.30 CHRONIC RENAL DISEASE, STAGE III (HCC): Status: ACTIVE | Noted: 2022-10-20

## 2022-10-20 PROBLEM — J44.9 COPD (CHRONIC OBSTRUCTIVE PULMONARY DISEASE) (HCC): Status: RESOLVED | Noted: 2019-07-01 | Resolved: 2022-10-20

## 2022-10-20 PROCEDURE — G8484 FLU IMMUNIZE NO ADMIN: HCPCS | Performed by: FAMILY MEDICINE

## 2022-10-20 PROCEDURE — G8420 CALC BMI NORM PARAMETERS: HCPCS | Performed by: FAMILY MEDICINE

## 2022-10-20 PROCEDURE — 1036F TOBACCO NON-USER: CPT | Performed by: FAMILY MEDICINE

## 2022-10-20 PROCEDURE — G8427 DOCREV CUR MEDS BY ELIG CLIN: HCPCS | Performed by: FAMILY MEDICINE

## 2022-10-20 PROCEDURE — 99213 OFFICE O/P EST LOW 20 MIN: CPT | Performed by: FAMILY MEDICINE

## 2022-10-20 PROCEDURE — 1123F ACP DISCUSS/DSCN MKR DOCD: CPT | Performed by: FAMILY MEDICINE

## 2022-10-20 PROCEDURE — 1090F PRES/ABSN URINE INCON ASSESS: CPT | Performed by: FAMILY MEDICINE

## 2022-10-20 RX ORDER — LOSARTAN POTASSIUM 25 MG/1
25 TABLET ORAL 2 TIMES DAILY
Qty: 180 TABLET | Refills: 1 | Status: SHIPPED | OUTPATIENT
Start: 2022-10-20

## 2022-10-20 RX ORDER — ATENOLOL 50 MG/1
50 TABLET ORAL 2 TIMES DAILY
Qty: 180 TABLET | Refills: 1 | Status: SHIPPED | OUTPATIENT
Start: 2022-10-20

## 2022-10-20 ASSESSMENT — ENCOUNTER SYMPTOMS
DIARRHEA: 0
ABDOMINAL PAIN: 0
EYE PAIN: 0
SHORTNESS OF BREATH: 0
CHEST TIGHTNESS: 0
NAUSEA: 0
CONSTIPATION: 0
TROUBLE SWALLOWING: 0
VOMITING: 0

## 2022-10-20 NOTE — PROGRESS NOTES
Macario Hdez (:  1933) is a 80 y.o. female,New patient, here for evaluation of the following chief complaint(s):  Established New Doctor         ASSESSMENT/PLAN:  1. Primary hypertension  Comments:  Continue meds the same. Avoid salt. Orders:  -     atenolol (TENORMIN) 50 MG tablet; Take 1 tablet by mouth 2 times daily, Disp-180 tablet, R-1Normal  -     losartan (COZAAR) 25 MG tablet; Take 1 tablet by mouth 2 times daily, Disp-180 tablet, R-1Normal  2. Stage 3a chronic kidney disease (HCC)  Comments:  Continue ACE. 3. Bilateral exudative age-related macular degeneration, unspecified stage [facility])  Comments:  Sees ophthalmologist.      Return in about 6 months (around 2023) for HTN. Subjective   SUBJECTIVE/OBJECTIVE:  Uses Osteobiflex OTC for arthritis. No NSAID's. Has CKD IIIa per labs 22. HTN controlled with losartan 25 mg 1 po bid and Atenolol 25 mg 2 po bid. Denies SE's from meds. No CP, H/A's, dizziness, or SOB. Feels good. Has a h/o high cholesterol but never treated with meds. Lost ~ 10#'s over past 2 years. Doping well except for poor eyesight from macular degeneration; sees Ophthalmologist. Occ trips and falls from poor sight. Review of Systems   Constitutional:  Negative for appetite change, fatigue and fever. HENT:  Negative for nosebleeds and trouble swallowing. Eyes:  Positive for visual disturbance. Negative for pain. Respiratory:  Negative for chest tightness and shortness of breath. Cardiovascular:  Negative for chest pain and leg swelling. Gastrointestinal:  Negative for abdominal pain, constipation, diarrhea, nausea and vomiting. Genitourinary:  Negative for difficulty urinating, dysuria and hematuria. Musculoskeletal:  Positive for arthralgias. Negative for joint swelling. Skin:  Negative for pallor and rash. Neurological:  Negative for dizziness, seizures, syncope, light-headedness and headaches.    Hematological:  Negative for adenopathy. Does not bruise/bleed easily. Psychiatric/Behavioral:  Negative for dysphoric mood and sleep disturbance. Objective   Physical Exam  Vitals reviewed. Constitutional:       General: She is not in acute distress. HENT:      Head: Normocephalic. Eyes:      Extraocular Movements: Extraocular movements intact. Pupils: Pupils are equal, round, and reactive to light. Cardiovascular:      Rate and Rhythm: Normal rate and regular rhythm. Heart sounds: No murmur heard. Pulmonary:      Effort: Pulmonary effort is normal.      Breath sounds: Normal breath sounds. Abdominal:      General: Abdomen is flat. Palpations: Abdomen is soft. There is no mass. Tenderness: There is no abdominal tenderness. Musculoskeletal:         General: No swelling. Cervical back: Neck supple. Lymphadenopathy:      Cervical: No cervical adenopathy. Skin:     Coloration: Skin is not jaundiced or pale. Neurological:      General: No focal deficit present. Mental Status: She is alert and oriented to person, place, and time. Psychiatric:         Mood and Affect: Mood normal.         Behavior: Behavior normal.              An electronic signature was used to authenticate this note.     --Liam Davis MD

## 2022-12-05 ENCOUNTER — OFFICE VISIT (OUTPATIENT)
Dept: FAMILY MEDICINE CLINIC | Age: 87
End: 2022-12-05
Payer: MEDICARE

## 2022-12-05 VITALS
OXYGEN SATURATION: 97 % | WEIGHT: 109 LBS | BODY MASS INDEX: 19.62 KG/M2 | DIASTOLIC BLOOD PRESSURE: 76 MMHG | HEART RATE: 68 BPM | SYSTOLIC BLOOD PRESSURE: 118 MMHG

## 2022-12-05 DIAGNOSIS — I10 PRIMARY HYPERTENSION: ICD-10-CM

## 2022-12-05 DIAGNOSIS — J40 BRONCHITIS: Primary | ICD-10-CM

## 2022-12-05 PROCEDURE — 1123F ACP DISCUSS/DSCN MKR DOCD: CPT | Performed by: FAMILY MEDICINE

## 2022-12-05 PROCEDURE — 1036F TOBACCO NON-USER: CPT | Performed by: FAMILY MEDICINE

## 2022-12-05 PROCEDURE — G8420 CALC BMI NORM PARAMETERS: HCPCS | Performed by: FAMILY MEDICINE

## 2022-12-05 PROCEDURE — G8484 FLU IMMUNIZE NO ADMIN: HCPCS | Performed by: FAMILY MEDICINE

## 2022-12-05 PROCEDURE — 99213 OFFICE O/P EST LOW 20 MIN: CPT | Performed by: FAMILY MEDICINE

## 2022-12-05 PROCEDURE — 1090F PRES/ABSN URINE INCON ASSESS: CPT | Performed by: FAMILY MEDICINE

## 2022-12-05 PROCEDURE — 99214 OFFICE O/P EST MOD 30 MIN: CPT | Performed by: FAMILY MEDICINE

## 2022-12-05 PROCEDURE — G8427 DOCREV CUR MEDS BY ELIG CLIN: HCPCS | Performed by: FAMILY MEDICINE

## 2022-12-05 RX ORDER — LACTOBACILLUS RHAMNOSUS GG 10B CELL
1 CAPSULE ORAL 2 TIMES DAILY
Qty: 60 TABLET | Refills: 0 | Status: SHIPPED | OUTPATIENT
Start: 2022-12-05

## 2022-12-05 NOTE — PROGRESS NOTES
HPI:  Patient comes in today for   Chief Complaint   Patient presents with    Follow-up     Was in the [facility] 11/26 was seen for a cold sx, feels better but still has a cough. Here for f/u was seen in  last week with c/o cold symptoms no fever or SOB still has cough ,not very productive any more was on Zpak and Amoxiciilin. CXR was negayive. h/o HTN is on atenolol  and Cozaar  HISTORY:  Past Medical History:   Diagnosis Date    Allergic rhinitis     Anxiety     Chronic kidney disease 10/18/2016    COPD (chronic obstructive [facility] disease) ([facility] 7/1/2019    Family history of colon cancer     Glaucoma     History of elevated lipids     History of fatigue     History of mitral valve prolapse     Hyperlipidemia     Hx of    Macular degeneration     Restless legs syndrome        Past Surgical History:   Procedure Laterality Date    BREAST BIOPSY Left     CARPAL TUNNEL RELEASE      COLONOSCOPY  09/08/09    -repeat 5 years    EYE SURGERY  2012    Dr Dmitry Martin (CERVIX STATUS UNKNOWN)      SINUS SURGERY      x2    TOE SURGERY          Family History   Problem Relation Age of Onset    Cancer Brother         colon       Social History     Socioeconomic History    Marital status: Unknown     Spouse name: Not on file    Number of children: Not on file    Years of education: Not on file    Highest education level: Not on file   Occupational History    Not on file   Tobacco Use    Smoking status: Never    Smokeless tobacco: Never   Substance and Sexual Activity    Alcohol use: No    Drug use: No    Sexual activity: Not on file   Other Topics Concern    Not on file   Social History Narrative    Not on file     Social Determinants of Health     Financial Resource Strain: Low Risk     Difficulty of Paying Living Expenses: Not hard at all   Food Insecurity: No Food Insecurity    Worried About Running Out of Food in the Last Year: Never true    [address] in the Last Year: Never true   Transportation Needs: Not on file   Physical Activity: Sufficiently Active    Days of Exercise per Week: 7 days    Minutes of Exercise per Session: 30 min   Stress: Not on file   Social Connections: Not on file   Intimate Partner Violence: Not on file   Housing Stability: Not on file       Current Outpatient Medications   Medication Sig Dispense Refill    atenolol (TENORMIN) 50 MG tablet Take 1 tablet by mouth 2 times daily 180 tablet 1    losartan (COZAAR) 25 MG tablet Take 1 tablet by mouth 2 times daily 180 tablet 1    GENERIC EXTERNAL MEDICATION daily Patient reports taking Billberry daily      Ascorbic Acid (VITAMIN C) 100 MG tablet Take 100 mg by mouth daily      Lutein 40 MG CAPS Take  by mouth. #1tab bid       No current facility-administered medications for this visit. Allergies   Allergen Reactions    Beconase [Beclomethasone]     Benadryl [Diphenhydramine]     Bicillin C-R [Penicillin G Benzathine & Proc]     Chloramphenicols     Ephedrine     Niacin And Related     Other      Flu Vaccine  Pseudophedrine  Chlordrine  Chlorpeniramine    Penicillins     Phenergan [Promethazine Hcl]     Phenylpropanolamine     Propacetamol     Proventil [Albuterol]     Seldane [Terfenadine]     Timoptic [Timolol Maleate]     Triamcinolone        REVIEW OF SYSTEMS:  General: No fevers, chills, change in weight  HEENT: No double vision, blurry vision, runny nose, sore throat, tinnitus  Cardio: No chest pain, palpitations, SUTTON, edema, PND  Pulmonary: Has cough, no hemoptysis,no  SOB  GI: No nausea, vomiting, dysphagia, odynophagia, has had some diarrhea.   : No dysuria, hematuria, urgency, incontinence  Musculoskeletal: No muscle or joint aches, no joint swelling  Neuro: No dizziness/lightheadedness, no seizures  Endocrine: No polyuria, polydipsia, polyphagia, no temperature intolerance  Skin: No lesions or itching  No problems with ADLs  Sleep: fair  Psychiatric: No depression,has anxiety    PHYSICAL EXAM:  VS:  /76 (Site: Left Upper Arm, Position: Sitting, Cuff Size: Medium Adult)   Pulse 68   Wt 109 lb (49.4 kg)   SpO2 97%   BMI 19.62 kg/m²   General:  Alert and oriented, NAD  HEENT:  TMs, YASEMIN, EOMI, Conjunctivae clear       Throat currently clear. NECK:  Supple without adenopathy or thyromegaly, no carotid bruits  LUNGS:  CTA all fields  HEART:  RRR without M, R, or G  ABDOMEN:  Soft and nontender without palpable abnormalities  EXTREMITIES:  Without clubbing, cyanosis, or edema, no calf tenderness  NEURO:  No focal deficits. SKIN:  warm to touch,normal texture. No active lesions. ASSESSMENT/PLAN:     Diagnosis Orders   1. Bronchitis        2. Primary hypertension            No orders of the defined types were placed in this encounter. Requested Prescriptions     Signed Prescriptions Disp Refills    Multiple Vitamins-Minerals (CULTURELLE PROBIOTICS + MULTIV) CHEW 60 tablet 0     Sig: Take 1 tablet by mouth 2 times daily   Advised Probiotics. Robittusin DM prn cough. Labs and xray reviewed  Continue Atenolol and Cozaar  Return in about 3 months (around 3/5/2023), or if symptoms worsen or fail to improve.     Electronically signed by Hien Valentine MD

## 2022-12-14 ENCOUNTER — OFFICE VISIT (OUTPATIENT)
Dept: PODIATRY | Age: 87
End: 2022-12-14
Payer: MEDICARE

## 2022-12-14 VITALS
OXYGEN SATURATION: 98 % | SYSTOLIC BLOOD PRESSURE: 124 MMHG | DIASTOLIC BLOOD PRESSURE: 66 MMHG | HEART RATE: 70 BPM | BODY MASS INDEX: 20.43 KG/M2 | HEIGHT: 62 IN | WEIGHT: 111 LBS

## 2022-12-14 DIAGNOSIS — L60.2 OG (ONYCHOGRYPHOSIS): ICD-10-CM

## 2022-12-14 DIAGNOSIS — N18.9 CHRONIC KIDNEY DISEASE, UNSPECIFIED CKD STAGE: Primary | ICD-10-CM

## 2022-12-14 DIAGNOSIS — B35.1 DERMATOPHYTOSIS OF NAIL: ICD-10-CM

## 2022-12-14 PROCEDURE — 11721 DEBRIDE NAIL 6 OR MORE: CPT | Performed by: PODIATRIST

## 2022-12-14 NOTE — PROGRESS NOTES
Foot Care Worksheet  PCP: Ni Hunt MD  Last visit: 7/27/2022    Nail description: Thick , Yellow , Crumbly , Marked limitation of ambulation     Pain resulting from thickened and dystrophy of nail plate Yes    Nails involved  Right   1, 2, 3, 4, 5  (T5-T9)  Left     1, 5  (TA-T4)    Q7 1 Class A Finding - Non traumatic amputation of foot No    Q8 2 Class B Findings - Absent DP pulse No, Absent PT pulse No, Advanced tropic changes (3 required) Yes    Decrease hair growth Yes, Nail changes/thickening Yes, Pigmented changes/discoloration Yes, Skin texture (thin, shiny) No, Skin color (rubor/redness) No    Q9 1 Class B and 2 Class C Findings  Claudication No, Temperature change Yes, Paresthesia No, Burning No, Edema Yes    Subjective:  Brit Miller is a 80 y.o. female who presents to the office today for foot care. Currently denies F/C/N/V. Allergies   Allergen Reactions    Beconase [Beclomethasone]     Benadryl [Diphenhydramine]     Bicillin C-R [Penicillin G Benzathine & Proc]     Chloramphenicols     Ephedrine     Niacin And Related     Other      Flu Vaccine  Pseudophedrine  Chlordrine  Chlorpeniramine    Penicillins     Phenergan [Promethazine Hcl]     Phenylpropanolamine     Propacetamol     Proventil [Albuterol]     Seldane [Terfenadine]     Timoptic [Timolol Maleate]     Triamcinolone        Past Medical History:   Diagnosis Date    Allergic rhinitis     Anxiety     Chronic kidney disease 10/18/2016    COPD (chronic obstructive pulmonary disease) ([facility] 7/1/2019    Family history of colon cancer     Glaucoma     History of elevated lipids     History of fatigue     History of mitral valve prolapse     Hyperlipidemia     Hx of    Macular degeneration     Restless legs syndrome        Prior to Admission medications    Medication Sig Start Date End Date Taking?  Authorizing Provider   Multiple Vitamins-Minerals (CULTURELLE PROBIOTICS + MULTIV) CHEW Take 1 tablet by mouth 2 times daily 12/5/22 Yes Eliseo Graham MD   atenolol (TENORMIN) 50 MG tablet Take 1 tablet by mouth 2 times daily 10/20/22  Yes Colletta Prosper, MD   losartan (COZAAR) 25 MG tablet Take 1 tablet by mouth 2 times daily 10/20/22  Yes Colletta Prosper, MD   [facility] EXTERNAL MEDICATION daily Patient reports taking Billberry daily   Yes Historical Provider, MD   Ascorbic Acid (VITAMIN C) 100 MG tablet Take 100 mg by mouth daily   Yes Historical Provider, MD   Lutein 40 MG CAPS Take  by mouth. #1tab bid   Yes Historical Provider, MD       Past Surgical History:   Procedure Laterality Date    BREAST BIOPSY Left     CARPAL TUNNEL RELEASE      COLONOSCOPY  09/08/09    -repeat 5 years    EYE SURGERY  2012    Dr Dakota Akers ([address] [place] [address])      SINUS SURGERY      x2    TOE SURGERY         Family History   Problem Relation Age of Onset    Cancer Brother         colon       Social History     Tobacco Use    Smoking status: Never    Smokeless tobacco: Never   Substance Use Topics    Alcohol use: No       ROS: All 14 ROS systems reviewed and pertinent positives noted above, all others negative. Lower Extremity Physical Examination:     Vitals:   Vitals:    12/14/22 1330   BP: 124/66   Pulse: 70   SpO2: 98%     General: AAO x 3 in NAD. Vascular: DP and PT pulses palpable 2/4, bilateral.  CFT <3 seconds, bilateral.  Hair growth absent to the level of the digits, bilateral.  Edema present, bilateral.  Varicosities present, bilateral. Erythema absent, bilateral. Distal Rubor absent bilateral.  Temperature decreased bilateral. Hyperpigmentation present bilateral. Atrophic skin yes. Neurological: Sensation intact to light touch to level of digits, bilateral.  Protective sensation intact 10/10 sites via 5.07/10g Dixon-Roselyn Monofilament, bilateral.  negative Tinel's, bilateral.  negative Valleix sign, bilateral.  Vibratory intact bilateral.  Reflexes Decreased bilateral.  Paresthesias negative. Dysthesias negative. Sharp/dull intact bilateral.    Musculoskeletal: Muscle strength 5/5, Bilateral.  Pain present upon palpation of b/l hallux, Bilateral. within normal limits medial longitudinal arch, Bilateral.  Ankle ROM decreased,Bilateral.  1st MPJ ROM within normal limits, Bilateral.  Dorsally contracted digits absent digits none, Bilateral. Other foot deformities none. Integument: Warm, dry, supple, Bilateral.  Open lesion absent, Bilateral.  Interdigital maceration absent to web spaces absent, Bilateral.  Nails left 1, 5 and right 1, 2, 3, 4, 5 thickened, dystrophic and crumbly, discolored with subungual debris. OG b/l hallux. Fissures absent, Bilateral. Hyperkeratotic tissue is absent. Asessment: Patient is a 80 y.o. female with:    Diagnosis Orders   1. Chronic kidney disease, unspecified CKD stage        2. Dermatophytosis of nail        3. OG (onychogryphosis)            Plan: Patient examined and evaluated. Current condition and treatment options discussed in detail. All nails as mentioned above debrided in length and thickness. Patient advised OTC methods for treatment of the mycotic nails. Patient will follow up in 10 weeks. Contact office with any questions/problems/concerns. RTC in 10week(s).

## 2023-03-06 ENCOUNTER — OFFICE VISIT (OUTPATIENT)
Dept: FAMILY MEDICINE CLINIC | Age: 88
End: 2023-03-06
Payer: MEDICARE

## 2023-03-06 VITALS
OXYGEN SATURATION: 97 % | BODY MASS INDEX: 20.12 KG/M2 | DIASTOLIC BLOOD PRESSURE: 84 MMHG | HEART RATE: 78 BPM | WEIGHT: 110 LBS | SYSTOLIC BLOOD PRESSURE: 132 MMHG

## 2023-03-06 DIAGNOSIS — E78.2 MIXED HYPERLIPIDEMIA: ICD-10-CM

## 2023-03-06 DIAGNOSIS — Z76.89 ENCOUNTER TO ESTABLISH CARE: Primary | ICD-10-CM

## 2023-03-06 DIAGNOSIS — I10 PRIMARY HYPERTENSION: ICD-10-CM

## 2023-03-06 PROCEDURE — 1123F ACP DISCUSS/DSCN MKR DOCD: CPT | Performed by: FAMILY MEDICINE

## 2023-03-06 PROCEDURE — G8484 FLU IMMUNIZE NO ADMIN: HCPCS | Performed by: FAMILY MEDICINE

## 2023-03-06 PROCEDURE — G8427 DOCREV CUR MEDS BY ELIG CLIN: HCPCS | Performed by: FAMILY MEDICINE

## 2023-03-06 PROCEDURE — 1036F TOBACCO NON-USER: CPT | Performed by: FAMILY MEDICINE

## 2023-03-06 PROCEDURE — G8420 CALC BMI NORM PARAMETERS: HCPCS | Performed by: FAMILY MEDICINE

## 2023-03-06 PROCEDURE — 99211 OFF/OP EST MAY X REQ PHY/QHP: CPT | Performed by: FAMILY MEDICINE

## 2023-03-06 PROCEDURE — 1090F PRES/ABSN URINE INCON ASSESS: CPT | Performed by: FAMILY MEDICINE

## 2023-03-06 PROCEDURE — 99214 OFFICE O/P EST MOD 30 MIN: CPT | Performed by: FAMILY MEDICINE

## 2023-03-06 RX ORDER — LOSARTAN POTASSIUM 25 MG/1
25 TABLET ORAL 2 TIMES DAILY
Qty: 180 TABLET | Refills: 1 | Status: SHIPPED | OUTPATIENT
Start: 2023-03-06

## 2023-03-06 RX ORDER — ATENOLOL 50 MG/1
50 TABLET ORAL 2 TIMES DAILY
Qty: 180 TABLET | Refills: 1 | Status: SHIPPED | OUTPATIENT
Start: 2023-03-06

## 2023-03-06 SDOH — ECONOMIC STABILITY: INCOME INSECURITY: HOW HARD IS IT FOR YOU TO PAY FOR THE VERY BASICS LIKE FOOD, HOUSING, MEDICAL CARE, AND HEATING?: NOT VERY HARD

## 2023-03-06 SDOH — ECONOMIC STABILITY: FOOD INSECURITY: WITHIN THE PAST 12 MONTHS, THE FOOD YOU BOUGHT JUST DIDN'T LAST AND YOU DIDN'T HAVE MONEY TO GET MORE.: NEVER TRUE

## 2023-03-06 SDOH — ECONOMIC STABILITY: HOUSING INSECURITY
IN THE LAST 12 MONTHS, WAS THERE A TIME WHEN YOU DID NOT HAVE A STEADY PLACE TO SLEEP OR SLEPT IN A SHELTER (INCLUDING NOW)?: NO

## 2023-03-06 SDOH — ECONOMIC STABILITY: FOOD INSECURITY: WITHIN THE PAST 12 MONTHS, YOU WORRIED THAT YOUR FOOD WOULD RUN OUT BEFORE YOU GOT MONEY TO BUY MORE.: NEVER TRUE

## 2023-03-06 ASSESSMENT — PATIENT HEALTH QUESTIONNAIRE - PHQ9
SUM OF ALL RESPONSES TO PHQ QUESTIONS 1-9: 0
2. FEELING DOWN, DEPRESSED OR HOPELESS: 0
SUM OF ALL RESPONSES TO PHQ QUESTIONS 1-9: 0

## 2023-03-06 NOTE — PROGRESS NOTES
HPI:  Patient comes in today for   Chief Complaint   Patient presents with    Follow-up     Needs refills     Established New Doctor   Here for establish h/o HTN needs med refills. h/o Hyperlipoidemia not on medications.h/o Macular deneration both eyes follows with optahalmology vision is poor. Denies any chest pain or shortness of breath.   HISTORY:  Past Medical History:   Diagnosis Date    Allergic rhinitis     Anxiety     Chronic kidney disease 10/18/2016    COPD (chronic obstructive pulmonary disease) ([address] 7/1/2019    Family history of colon cancer     Glaucoma     History of elevated lipids     History of fatigue     History of mitral valve prolapse     Hyperlipidemia     Hx of    Macular degeneration     Restless legs syndrome        Past Surgical History:   Procedure Laterality Date    BREAST BIOPSY Left     CARPAL TUNNEL RELEASE      COLONOSCOPY  09/08/09    -repeat 5 years    EYE SURGERY  2012    Dr Zeny Partida (CERVIX STATUS UNKNOWN)      SINUS SURGERY      x2    TOE SURGERY          Family History   Problem Relation Age of Onset    Cancer Brother         colon       Social History     Socioeconomic History    Marital status: Unknown     Spouse name: Not on file    Number of children: Not on file    Years of education: Not on file    Highest education level: Not on file   Occupational History    Not on file   Tobacco Use    Smoking status: Never    Smokeless tobacco: Never   Substance and Sexual Activity    Alcohol use: No    Drug use: No    Sexual activity: Not on file   Other Topics Concern    Not on file   Social History Narrative    Not on file     Social Determinants of Health     Financial Resource Strain: Low Risk     Difficulty of Paying Living Expenses: Not very hard   Food Insecurity: No Food Insecurity    Worried About Running Out of Food in the Last Year: Never true    Ran Out of Food in the Last Year: Never true   Transportation Needs: Unknown    Lack of Transportation (Medical): Not on file    Lack of Transportation (Non-Medical): No   Physical Activity: Sufficiently Active    Days of Exercise per Week: 7 days    Minutes of Exercise per Session: 30 min   Stress: Not on file   Social Connections: Not on file   Intimate Partner Violence: Not on file   Housing Stability: Unknown    Unable to Pay for Housing in the Last Year: Not on file    Number of Places Lived in the Last Year: Not on file    Unstable Housing in the Last Year: No       Current Outpatient Medications   Medication Sig Dispense Refill    Multiple Vitamins-Minerals (CULTURELLE PROBIOTICS + MULTIV) CHEW Take 1 tablet by mouth 2 times daily 60 tablet 0    atenolol (TENORMIN) 50 MG tablet Take 1 tablet by mouth 2 times daily 180 tablet 1    losartan (COZAAR) 25 MG tablet Take 1 tablet by mouth 2 times daily 180 tablet 1    GENERIC EXTERNAL MEDICATION daily Patient reports taking Billberry daily      Ascorbic Acid (VITAMIN C) 100 MG tablet Take 100 mg by mouth daily      Lutein 40 MG CAPS Take  by mouth. #1tab bid       No current facility-administered medications for this visit. Allergies   Allergen Reactions    Beconase [Beclomethasone]     Benadryl [Diphenhydramine]     Bicillin C-R [Penicillin G Benzathine & Proc]     Chloramphenicols     Ephedrine     Niacin And Related     Other      Flu Vaccine  Pseudophedrine  Chlordrine  Chlorpeniramine    Penicillins     Phenergan [Promethazine Hcl]     Phenylpropanolamine     Propacetamol     Proventil [Albuterol]     Seldane [Terfenadine]     Timoptic [Timolol Maleate]     Triamcinolone        REVIEW OF SYSTEMS:  General: No fevers, chills, change in weight  HEENT: No double vision, has poor vision both eyes, no runny nose, sore throat or tinnitus  Cardio: No chest pain, palpitations, SUTTON, edema, PND  Pulmonary: Has cough, no hemoptysis,no  SOB  GI: No nausea, vomiting, dysphagia, odynophagia, has had some diarrhea.   : No dysuria, hematuria, urgency, incontinence  Musculoskeletal: No muscle or joint aches, no joint swelling  Neuro: No dizziness/lightheadedness, no seizures  Endocrine: No polyuria, polydipsia, polyphagia, no temperature intolerance  Skin: No lesions or itching  No problems with ADLs  Sleep: fair  Psychiatric: No depression,has anxiety    PHYSICAL EXAM:  VS:  /84 (Site: Left Upper Arm, Position: Sitting, Cuff Size: Medium Adult)   Pulse 78   Wt 110 lb (49.9 kg)   SpO2 97%   BMI 20.12 kg/m²   General:  Alert and oriented, NAD  HEENT:  TMs, YASEMIN, EOMI, Conjunctivae clear       Throat currently clear. NECK:  Supple without adenopathy or thyromegaly, no carotid bruits  LUNGS:  CTA all fields  HEART:  RRR without M, R, or G  ABDOMEN:  Soft and nontender without palpable abnormalities  EXTREMITIES:  Without clubbing, cyanosis, or edema, no calf tenderness  NEURO:  No focal deficits. SKIN:  warm to touch,normal texture. No active lesions. ASSESSMENT/PLAN:     Diagnosis Orders   1. Encounter to establish care        2. Primary hypertension Controlled atenolol (TENORMIN) 50 MG tablet    losartan (COZAAR) 25 MG tablet    Continue meds the same. Avoid salt. 3. Mixed hyperlipidemia            No orders of the defined types were placed in this encounter. Requested Prescriptions     Pending Prescriptions Disp Refills    atenolol (TENORMIN) 50 MG tablet 180 tablet 1     Sig: Take 1 tablet by mouth 2 times daily    losartan (COZAAR) 25 MG tablet 180 tablet 1     Sig: Take 1 tablet by mouth 2 times daily   Continue with Cozaar and losartan  Patient has opted no treatment for elevated cholesterol,has had it for more then 40 yaers  Recommend shingrix vaccine but patient declines  Return in about 6 months (around 9/6/2023).     Electronically signed by Jessy Davila MD

## 2023-04-27 ENCOUNTER — OFFICE VISIT (OUTPATIENT)
Dept: PODIATRY | Age: 88
End: 2023-04-27
Payer: MEDICARE

## 2023-04-27 VITALS
WEIGHT: 115 LBS | DIASTOLIC BLOOD PRESSURE: 68 MMHG | HEIGHT: 62 IN | BODY MASS INDEX: 21.16 KG/M2 | SYSTOLIC BLOOD PRESSURE: 100 MMHG | HEART RATE: 65 BPM

## 2023-04-27 DIAGNOSIS — B35.1 DERMATOPHYTOSIS OF NAIL: ICD-10-CM

## 2023-04-27 DIAGNOSIS — L60.2 OG (ONYCHOGRYPHOSIS): ICD-10-CM

## 2023-04-27 DIAGNOSIS — N18.9 CHRONIC KIDNEY DISEASE, UNSPECIFIED CKD STAGE: Primary | ICD-10-CM

## 2023-04-27 PROCEDURE — 11721 DEBRIDE NAIL 6 OR MORE: CPT | Performed by: PODIATRIST

## 2023-04-27 NOTE — PROGRESS NOTES
Sharp/dull intact bilateral.    Musculoskeletal: Muscle strength 5/5, Bilateral.  Pain present upon palpation of b/l hallux, Bilateral. within normal limits medial longitudinal arch, Bilateral.  Ankle ROM decreased,Bilateral.  1st MPJ ROM within normal limits, Bilateral.  Dorsally contracted digits absent digits none, Bilateral. Other foot deformities none. Integument: Warm, dry, supple, Bilateral.  Open lesion absent, Bilateral.  Interdigital maceration absent to web spaces absent, Bilateral.  Nails left 1, 5 and right 1, 2, 3, 4, 5 thickened, dystrophic and crumbly, discolored with subungual debris. OG b/l hallux. Fissures absent, Bilateral. Hyperkeratotic tissue is absent. Asessment: Patient is a 80 y.o. female with:    Diagnosis Orders   1. Chronic kidney disease, unspecified CKD stage        2. Dermatophytosis of nail        3. OG (onychogryphosis)              Plan: Patient examined and evaluated. Current condition and treatment options discussed in detail. All nails as mentioned above debrided in length and thickness. Patient advised OTC methods for treatment of the mycotic nails. Patient will follow up in 10 weeks. Contact office with any questions/problems/concerns. RTC in 10week(s).

## 2023-08-23 ENCOUNTER — OFFICE VISIT (OUTPATIENT)
Dept: PODIATRY | Age: 88
End: 2023-08-23
Payer: MEDICARE

## 2023-08-23 VITALS
HEIGHT: 62 IN | BODY MASS INDEX: 20.98 KG/M2 | HEART RATE: 61 BPM | SYSTOLIC BLOOD PRESSURE: 120 MMHG | DIASTOLIC BLOOD PRESSURE: 60 MMHG | WEIGHT: 114 LBS

## 2023-08-23 DIAGNOSIS — B35.1 DERMATOPHYTOSIS OF NAIL: ICD-10-CM

## 2023-08-23 DIAGNOSIS — N18.9 CHRONIC KIDNEY DISEASE, UNSPECIFIED CKD STAGE: Primary | ICD-10-CM

## 2023-08-23 DIAGNOSIS — L60.2 OG (ONYCHOGRYPHOSIS): ICD-10-CM

## 2023-08-23 PROCEDURE — 99999 PR OFFICE/OUTPT VISIT,PROCEDURE ONLY: CPT | Performed by: PODIATRIST

## 2023-08-23 PROCEDURE — 11721 DEBRIDE NAIL 6 OR MORE: CPT | Performed by: PODIATRIST

## 2023-08-23 NOTE — PROGRESS NOTES
Foot Care Worksheet  PCP: Aura Stack MD  Last visit: 3/6/23    Nail description: Thick , Yellow , Crumbly , Marked limitation of ambulation     Pain resulting from thickened and dystrophy of nail plate Yes    Nails involved  Right   1, 2, 3, 4, 5  (T5-T9)  Left     1, 5  (TA-T4)    Q7 1 Class A Finding - Non traumatic amputation of foot No    Q8 2 Class B Findings - Absent DP pulse No, Absent PT pulse No, Advanced tropic changes (3 required) Yes    Decrease hair growth Yes, Nail changes/thickening Yes, Pigmented changes/discoloration Yes, Skin texture (thin, shiny) No, Skin color (rubor/redness) No    Q9 1 Class B and 2 Class C Findings  Claudication No, Temperature change Yes, Paresthesia No, Burning No, Edema Yes    Subjective:  Lexy Pena is a 80 y.o. female who presents to the office today for foot care. Currently denies F/C/N/V. Allergies   Allergen Reactions    Beconase [Beclomethasone]     Benadryl [Diphenhydramine]     Bicillin C-R [Penicillin G Benzathine & Proc]     Chloramphenicols     Ephedrine     Niacin And Related     Other      Flu Vaccine  Pseudophedrine  Chlordrine  Chlorpeniramine    Penicillins     Phenergan [Promethazine Hcl]     Phenylpropanolamine     Propacetamol     Proventil [Albuterol]     Seldane [Terfenadine]     Timoptic [Timolol Maleate]     Triamcinolone        Past Medical History:   Diagnosis Date    Allergic rhinitis     Anxiety     Chronic kidney disease 10/18/2016    COPD (chronic obstructive pulmonary disease) ([facility] [address] [facility]) 7/1/2019    Family history of colon cancer     Glaucoma     History of elevated lipids     History of fatigue     History of mitral valve prolapse     Hyperlipidemia     Hx of    Macular degeneration     Restless legs syndrome        Prior to Admission medications    Medication Sig Start Date End Date Taking?  Authorizing Provider   atenolol (TENORMIN) 50 MG tablet Take 1 tablet by mouth 2 times daily 3/6/23  Yes Aura Stack MD

## 2023-09-06 ENCOUNTER — OFFICE VISIT (OUTPATIENT)
Dept: FAMILY MEDICINE CLINIC | Age: 88
End: 2023-09-06
Payer: MEDICARE

## 2023-09-06 VITALS
BODY MASS INDEX: 21.16 KG/M2 | WEIGHT: 115 LBS | OXYGEN SATURATION: 99 % | SYSTOLIC BLOOD PRESSURE: 115 MMHG | DIASTOLIC BLOOD PRESSURE: 82 MMHG | HEART RATE: 55 BPM | HEIGHT: 62 IN

## 2023-09-06 DIAGNOSIS — I10 PRIMARY HYPERTENSION: ICD-10-CM

## 2023-09-06 DIAGNOSIS — H35.3230 BILATERAL EXUDATIVE AGE-RELATED MACULAR DEGENERATION, UNSPECIFIED STAGE (HCC): ICD-10-CM

## 2023-09-06 DIAGNOSIS — Z91.81 AT HIGH RISK FOR FALLS: ICD-10-CM

## 2023-09-06 PROCEDURE — 1036F TOBACCO NON-USER: CPT | Performed by: FAMILY MEDICINE

## 2023-09-06 PROCEDURE — 99214 OFFICE O/P EST MOD 30 MIN: CPT | Performed by: FAMILY MEDICINE

## 2023-09-06 PROCEDURE — 1123F ACP DISCUSS/DSCN MKR DOCD: CPT | Performed by: FAMILY MEDICINE

## 2023-09-06 PROCEDURE — G8420 CALC BMI NORM PARAMETERS: HCPCS | Performed by: FAMILY MEDICINE

## 2023-09-06 PROCEDURE — G8427 DOCREV CUR MEDS BY ELIG CLIN: HCPCS | Performed by: FAMILY MEDICINE

## 2023-09-06 PROCEDURE — 99212 OFFICE O/P EST SF 10 MIN: CPT | Performed by: FAMILY MEDICINE

## 2023-09-06 PROCEDURE — 1090F PRES/ABSN URINE INCON ASSESS: CPT | Performed by: FAMILY MEDICINE

## 2023-09-06 RX ORDER — LACTOBACILLUS RHAMNOSUS GG 10B CELL
1 CAPSULE ORAL 2 TIMES DAILY
Qty: 60 TABLET | Refills: 0 | Status: SHIPPED | OUTPATIENT
Start: 2023-09-06

## 2023-09-06 RX ORDER — ATENOLOL 50 MG/1
50 TABLET ORAL 2 TIMES DAILY
Qty: 180 TABLET | Refills: 1 | Status: SHIPPED | OUTPATIENT
Start: 2023-09-06

## 2023-09-06 RX ORDER — LOSARTAN POTASSIUM 25 MG/1
25 TABLET ORAL 2 TIMES DAILY
Qty: 180 TABLET | Refills: 1 | Status: SHIPPED | OUTPATIENT
Start: 2023-09-06

## 2023-09-06 NOTE — PROGRESS NOTES
HPI:  Patient comes in today for   Chief Complaint   Patient presents with    Follow-up     Pt is here for 6 month f/u. Pt has no concerns. Here for 6 months f/u  h/o HTN needs med refills. h/o Hyperlipidemia not on medications.h/o Macular deneration both eyes follows with optahalmology vision is poor. H/o CKD is stableDenies any chest pain or shortness of breath. No new complaints  HISTORY:  Past Medical History:   Diagnosis Date    Allergic rhinitis     Anxiety     Chronic kidney disease 10/18/2016    COPD (chronic obstructive pulmonary disease) ([address]) 7/1/2019    Family history of colon cancer     Glaucoma     History of elevated lipids     History of fatigue     History of mitral valve prolapse     Hyperlipidemia     Hx of    Macular degeneration     Restless legs syndrome        Past Surgical History:   Procedure Laterality Date    BREAST BIOPSY Left     CARPAL TUNNEL RELEASE      COLONOSCOPY  09/08/09    -repeat 5 years    EYE SURGERY  2012    Dr Cayetano Martin (CERVIX STATUS UNKNOWN)      SINUS SURGERY      x2    TOE SURGERY          Family History   Problem Relation Age of Onset    Cancer Brother         colon       Social History     Socioeconomic History    Marital status: Unknown     Spouse name: Not on file    Number of children: Not on file    Years of education: Not on file    Highest education level: Not on file   Occupational History    Not on file   Tobacco Use    Smoking status: Never    Smokeless tobacco: Never   Substance and Sexual Activity    Alcohol use: No    Drug use: No    Sexual activity: Not on file   Other Topics Concern    Not on file   Social History Narrative    Not on file     Social Determinants of Health     Financial Resource Strain: Low Risk     Difficulty of Paying Living Expenses: Not very hard   Food Insecurity: No Food Insecurity    Worried About Running Out of Food in the Last Year: Never true    Ran Out of Food in the Last Year: Never true   Transportation

## 2023-09-15 ENCOUNTER — OFFICE VISIT (OUTPATIENT)
Dept: FAMILY MEDICINE CLINIC | Age: 88
End: 2023-09-15
Payer: MEDICARE

## 2023-09-15 VITALS
WEIGHT: 115.4 LBS | OXYGEN SATURATION: 98 % | RESPIRATION RATE: 14 BRPM | HEIGHT: 62 IN | TEMPERATURE: 98.4 F | SYSTOLIC BLOOD PRESSURE: 138 MMHG | HEART RATE: 61 BPM | BODY MASS INDEX: 21.23 KG/M2 | DIASTOLIC BLOOD PRESSURE: 70 MMHG

## 2023-09-15 DIAGNOSIS — I10 PRIMARY HYPERTENSION: ICD-10-CM

## 2023-09-15 DIAGNOSIS — Z00.00 MEDICARE ANNUAL WELLNESS VISIT, SUBSEQUENT: Primary | ICD-10-CM

## 2023-09-15 DIAGNOSIS — N18.31 STAGE 3A CHRONIC KIDNEY DISEASE (HCC): ICD-10-CM

## 2023-09-15 PROCEDURE — G0439 PPPS, SUBSEQ VISIT: HCPCS | Performed by: FAMILY MEDICINE

## 2023-09-15 PROCEDURE — 1123F ACP DISCUSS/DSCN MKR DOCD: CPT | Performed by: FAMILY MEDICINE

## 2023-09-15 ASSESSMENT — PATIENT HEALTH QUESTIONNAIRE - PHQ9
SUM OF ALL RESPONSES TO PHQ9 QUESTIONS 1 & 2: 0
SUM OF ALL RESPONSES TO PHQ QUESTIONS 1-9: 0
SUM OF ALL RESPONSES TO PHQ QUESTIONS 1-9: 0
1. LITTLE INTEREST OR PLEASURE IN DOING THINGS: 0
SUM OF ALL RESPONSES TO PHQ QUESTIONS 1-9: 0
2. FEELING DOWN, DEPRESSED OR HOPELESS: 0
SUM OF ALL RESPONSES TO PHQ QUESTIONS 1-9: 0

## 2023-09-15 ASSESSMENT — LIFESTYLE VARIABLES
HOW MANY STANDARD DRINKS CONTAINING ALCOHOL DO YOU HAVE ON A TYPICAL DAY: PATIENT DOES NOT DRINK
HOW OFTEN DO YOU HAVE A DRINK CONTAINING ALCOHOL: NEVER

## 2023-10-18 ENCOUNTER — OFFICE VISIT (OUTPATIENT)
Dept: FAMILY MEDICINE CLINIC | Age: 88
End: 2023-10-18
Payer: MEDICARE

## 2023-10-18 VITALS
BODY MASS INDEX: 20.8 KG/M2 | HEART RATE: 59 BPM | WEIGHT: 113 LBS | SYSTOLIC BLOOD PRESSURE: 110 MMHG | OXYGEN SATURATION: 96 % | HEIGHT: 62 IN | DIASTOLIC BLOOD PRESSURE: 80 MMHG

## 2023-10-18 DIAGNOSIS — E78.2 MIXED HYPERLIPIDEMIA: ICD-10-CM

## 2023-10-18 DIAGNOSIS — I10 PRIMARY HYPERTENSION: ICD-10-CM

## 2023-10-18 DIAGNOSIS — H35.3230 BILATERAL EXUDATIVE AGE-RELATED MACULAR DEGENERATION, UNSPECIFIED STAGE (HCC): ICD-10-CM

## 2023-10-18 DIAGNOSIS — K52.9 CHRONIC DIARRHEA: Primary | ICD-10-CM

## 2023-10-18 PROCEDURE — G8427 DOCREV CUR MEDS BY ELIG CLIN: HCPCS | Performed by: FAMILY MEDICINE

## 2023-10-18 PROCEDURE — 99212 OFFICE O/P EST SF 10 MIN: CPT | Performed by: FAMILY MEDICINE

## 2023-10-18 PROCEDURE — 99214 OFFICE O/P EST MOD 30 MIN: CPT | Performed by: FAMILY MEDICINE

## 2023-10-18 PROCEDURE — G8420 CALC BMI NORM PARAMETERS: HCPCS | Performed by: FAMILY MEDICINE

## 2023-10-18 PROCEDURE — 1123F ACP DISCUSS/DSCN MKR DOCD: CPT | Performed by: FAMILY MEDICINE

## 2023-10-18 PROCEDURE — 1036F TOBACCO NON-USER: CPT | Performed by: FAMILY MEDICINE

## 2023-10-18 PROCEDURE — G8484 FLU IMMUNIZE NO ADMIN: HCPCS | Performed by: FAMILY MEDICINE

## 2023-10-18 PROCEDURE — 1090F PRES/ABSN URINE INCON ASSESS: CPT | Performed by: FAMILY MEDICINE

## 2023-10-18 RX ORDER — CHOLESTYRAMINE 4 G/9G
1 POWDER, FOR SUSPENSION ORAL 2 TIMES DAILY
Qty: 90 PACKET | Refills: 3 | Status: SHIPPED | OUTPATIENT
Start: 2023-10-18

## 2023-10-18 NOTE — PROGRESS NOTES
HPI:  Patient comes in today for   Chief Complaint   Patient presents with    Diarrhea     Pt is here for having diarrhea every day. Pt stated she has had it for months. Here with c/o chronic diarrhea has had it for several years has tried metamucil with out much relief. Has had work up in past.h/o HTN is stable h/o Hyperlipidemia not on medications.h/o Macular deneration both eyes follows with optahalmology vision is poor. H/o CKD is stable. Denies any chest pain or shortness of breath.   HISTORY:  Past Medical History:   Diagnosis Date    Allergic rhinitis     Anxiety     Chronic kidney disease 10/18/2016    COPD (chronic obstructive pulmonary disease) ([address]) 7/1/2019    Family history of colon cancer     Glaucoma     History of elevated lipids     History of fatigue     History of mitral valve prolapse     Hyperlipidemia     Hx of    Macular degeneration     Restless legs syndrome        Past Surgical History:   Procedure Laterality Date    BREAST BIOPSY Left     CARPAL TUNNEL RELEASE      COLONOSCOPY  09/08/09    -repeat 5 years    EYE SURGERY  2012    Dr Christin Nick (CERVIX STATUS UNKNOWN)      SINUS SURGERY      x2    TOE SURGERY          Family History   Problem Relation Age of Onset    Cancer Brother         colon       Social History     Socioeconomic History    Marital status: Unknown     Spouse name: Not on file    Number of children: Not on file    Years of education: Not on file    Highest education level: Not on file   Occupational History    Not on file   Tobacco Use    Smoking status: Never    Smokeless tobacco: Never   Substance and Sexual Activity    Alcohol use: No    Drug use: No    Sexual activity: Not on file   Other Topics Concern    Not on file   Social History Narrative    Not on file     Social Determinants of Health     Financial Resource Strain: Low Risk  (3/6/2023)    Overall Financial Resource Strain (CARDIA)     Difficulty of Paying Living Expenses: Not very hard
No

## 2023-10-19 LAB — MISCELLANEOUS LAB TEST RESULT: NORMAL

## 2023-11-20 ENCOUNTER — OFFICE VISIT (OUTPATIENT)
Dept: FAMILY MEDICINE CLINIC | Age: 88
End: 2023-11-20
Payer: MEDICARE

## 2023-11-20 VITALS
BODY MASS INDEX: 21.16 KG/M2 | HEART RATE: 55 BPM | WEIGHT: 115 LBS | HEIGHT: 62 IN | DIASTOLIC BLOOD PRESSURE: 82 MMHG | SYSTOLIC BLOOD PRESSURE: 115 MMHG | OXYGEN SATURATION: 100 %

## 2023-11-20 DIAGNOSIS — K52.9 CHRONIC DIARRHEA: Primary | ICD-10-CM

## 2023-11-20 DIAGNOSIS — H35.3230 BILATERAL EXUDATIVE AGE-RELATED MACULAR DEGENERATION, UNSPECIFIED STAGE (HCC): ICD-10-CM

## 2023-11-20 DIAGNOSIS — I10 PRIMARY HYPERTENSION: ICD-10-CM

## 2023-11-20 PROCEDURE — 1090F PRES/ABSN URINE INCON ASSESS: CPT | Performed by: FAMILY MEDICINE

## 2023-11-20 PROCEDURE — 1123F ACP DISCUSS/DSCN MKR DOCD: CPT | Performed by: FAMILY MEDICINE

## 2023-11-20 PROCEDURE — G8427 DOCREV CUR MEDS BY ELIG CLIN: HCPCS | Performed by: FAMILY MEDICINE

## 2023-11-20 PROCEDURE — G8484 FLU IMMUNIZE NO ADMIN: HCPCS | Performed by: FAMILY MEDICINE

## 2023-11-20 PROCEDURE — G8420 CALC BMI NORM PARAMETERS: HCPCS | Performed by: FAMILY MEDICINE

## 2023-11-20 PROCEDURE — 99213 OFFICE O/P EST LOW 20 MIN: CPT | Performed by: FAMILY MEDICINE

## 2023-11-20 PROCEDURE — 1036F TOBACCO NON-USER: CPT | Performed by: FAMILY MEDICINE

## 2023-11-20 NOTE — PROGRESS NOTES
HPI:  Patient comes in today for   Chief Complaint   Patient presents with    Follow-up     Pt is here for a 5 week f/u. Pt states she is doing better. Here to f/u on  chronic diarrhea  was started on cholestyramine but did not continue taking it as her diarrhea improved  since she quit using the [place] sweetenerh/o HTN is stable h/o Hyperlipidemia not on medications.h/o Macular deneration both eyes follows with optahalmology vision is poor. H/o CKD is stable. Denies any chest pain or shortness of breath.   HISTORY:  Past Medical History:   Diagnosis Date    Allergic rhinitis     Anxiety     Chronic kidney disease 10/18/2016    COPD (chronic obstructive pulmonary disease) ([address]) 7/1/2019    Family history of colon cancer     Glaucoma     History of elevated lipids     History of fatigue     History of mitral valve prolapse     Hyperlipidemia     Hx of    Macular degeneration     Restless legs syndrome        Past Surgical History:   Procedure Laterality Date    BREAST BIOPSY Left     CARPAL TUNNEL RELEASE      COLONOSCOPY  09/08/09    -repeat 5 years    EYE SURGERY  2012    Dr Thomas Anand (CERVIX STATUS UNKNOWN)      SINUS SURGERY      x2    TOE SURGERY          Family History   Problem Relation Age of Onset    Cancer Brother         colon       Social History     Socioeconomic History    Marital status: Unknown     Spouse name: Not on file    Number of children: Not on file    Years of education: Not on file    Highest education level: Not on file   Occupational History    Not on file   Tobacco Use    Smoking status: Never    Smokeless tobacco: Never   Substance and Sexual Activity    Alcohol use: No    Drug use: No    Sexual activity: Not on file   Other Topics Concern    Not on file   Social History Narrative    Not on file     Social Determinants of Health     Financial Resource Strain: Low Risk  (3/6/2023)    Overall Financial Resource Strain (CARDIA)     Difficulty of Paying Living Expenses:

## 2023-11-24 ENCOUNTER — TELEPHONE (OUTPATIENT)
Dept: FAMILY MEDICINE CLINIC | Age: 88
End: 2023-11-24

## 2023-11-24 NOTE — TELEPHONE ENCOUNTER
Patient was DC from [facility] on 11/23/2023 for UTI.   Will see Ching Holman on TUE. 11/28/2023 at 1100 am.

## 2023-11-28 ENCOUNTER — OFFICE VISIT (OUTPATIENT)
Dept: FAMILY MEDICINE CLINIC | Age: 88
End: 2023-11-28

## 2023-11-28 VITALS
DIASTOLIC BLOOD PRESSURE: 82 MMHG | OXYGEN SATURATION: 100 % | WEIGHT: 113 LBS | BODY MASS INDEX: 20.8 KG/M2 | HEIGHT: 62 IN | SYSTOLIC BLOOD PRESSURE: 120 MMHG | HEART RATE: 54 BPM

## 2023-11-28 DIAGNOSIS — R53.1 GENERALIZED WEAKNESS: ICD-10-CM

## 2023-11-28 DIAGNOSIS — N39.0 URINARY TRACT INFECTION WITHOUT HEMATURIA, SITE UNSPECIFIED: Primary | ICD-10-CM

## 2023-11-28 DIAGNOSIS — I10 PRIMARY HYPERTENSION: ICD-10-CM

## 2023-11-28 DIAGNOSIS — Z09 HOSPITAL DISCHARGE FOLLOW-UP: ICD-10-CM

## 2023-11-28 RX ORDER — CEPHALEXIN 500 MG/1
CAPSULE ORAL
COMMUNITY
Start: 2023-11-24

## 2023-11-28 NOTE — PROGRESS NOTES
Sexual activity: Not on file   Other Topics Concern    Not on file   Social History Narrative    Not on file     Social Determinants of Health     Financial Resource Strain: Low Risk  (3/6/2023)    Overall Financial Resource Strain (CARDIA)     Difficulty of Paying Living Expenses: Not very hard   Food Insecurity: No Food Insecurity (3/6/2023)    Hunger Vital Sign     Worried About Running Out of Food in the Last Year: Never true     Ran Out of Food in the Last Year: Never true   Transportation Needs: Unknown (3/6/2023)    PRAPARE - Transportation     Lack of Transportation (Medical): Not on file     Lack of Transportation (Non-Medical): No   Physical Activity: Insufficiently Active (9/15/2023)    Exercise Vital Sign     Days of Exercise per Week: 7 days     Minutes of Exercise per Session: 20 min   Stress: Not on file   Social Connections: Not on file   Intimate Partner Violence: Not on file   Housing Stability: Unknown (3/6/2023)    Housing Stability Vital Sign     Unable to Pay for Housing in the Last Year: Not on file     Number of [address] in the Last Year: Not on file     Unstable Housing in the Last Year: No       Current Outpatient Medications   Medication Sig Dispense Refill    atenolol (TENORMIN) 50 MG tablet Take 1 tablet by mouth 2 times daily 180 tablet 1    losartan (COZAAR) 25 MG tablet Take 1 tablet by mouth 2 times daily 180 tablet 1    GENERIC EXTERNAL MEDICATION daily Patient reports taking Billberry daily      Ascorbic Acid (VITAMIN C) 100 MG tablet Take 1 tablet by mouth daily      Lutein 40 MG CAPS Take  by mouth. #1tab bid      cephALEXin (KEFLEX) 500 MG capsule TAKE 1 CAPSULE BY MOUTH TWICE DAILY FOR 7 DAYS       No current facility-administered medications for this visit.         Allergies   Allergen Reactions    Beconase [Beclomethasone]     Benadryl [Diphenhydramine]     Bicillin C-R [Penicillin G Benzathine & Proc]     Chloramphenicols     Ephedrine     Niacin And Related     Other

## 2023-12-13 ENCOUNTER — OFFICE VISIT (OUTPATIENT)
Dept: PODIATRY | Age: 88
End: 2023-12-13
Payer: MEDICARE

## 2023-12-13 VITALS
SYSTOLIC BLOOD PRESSURE: 118 MMHG | DIASTOLIC BLOOD PRESSURE: 70 MMHG | HEIGHT: 62 IN | BODY MASS INDEX: 20.61 KG/M2 | WEIGHT: 112 LBS | HEART RATE: 61 BPM

## 2023-12-13 DIAGNOSIS — B35.1 DERMATOPHYTOSIS OF NAIL: ICD-10-CM

## 2023-12-13 DIAGNOSIS — L60.2 OG (ONYCHOGRYPHOSIS): ICD-10-CM

## 2023-12-13 DIAGNOSIS — N18.9 CHRONIC KIDNEY DISEASE, UNSPECIFIED CKD STAGE: Primary | ICD-10-CM

## 2023-12-13 PROCEDURE — 99999 PR OFFICE/OUTPT VISIT,PROCEDURE ONLY: CPT | Performed by: PODIATRIST

## 2023-12-13 PROCEDURE — 11721 DEBRIDE NAIL 6 OR MORE: CPT | Performed by: PODIATRIST

## 2024-01-02 ENCOUNTER — CLINICAL DOCUMENTATION ONLY (OUTPATIENT)
Facility: CLINIC | Age: 89
End: 2024-01-02

## 2024-01-05 ENCOUNTER — OFFICE VISIT (OUTPATIENT)
Dept: FAMILY MEDICINE CLINIC | Age: 89
End: 2024-01-05
Payer: MEDICARE

## 2024-01-05 VITALS
WEIGHT: 114.8 LBS | HEIGHT: 62 IN | TEMPERATURE: 98.5 F | SYSTOLIC BLOOD PRESSURE: 190 MMHG | OXYGEN SATURATION: 99 % | HEART RATE: 62 BPM | BODY MASS INDEX: 21.12 KG/M2 | DIASTOLIC BLOOD PRESSURE: 92 MMHG | RESPIRATION RATE: 16 BRPM

## 2024-01-05 DIAGNOSIS — I10 PRIMARY HYPERTENSION: Primary | ICD-10-CM

## 2024-01-05 DIAGNOSIS — H35.3230 BILATERAL EXUDATIVE AGE-RELATED MACULAR DEGENERATION, UNSPECIFIED STAGE (HCC): ICD-10-CM

## 2024-01-05 DIAGNOSIS — I10 PRIMARY HYPERTENSION: ICD-10-CM

## 2024-01-05 DIAGNOSIS — N18.31 STAGE 3A CHRONIC KIDNEY DISEASE (HCC): ICD-10-CM

## 2024-01-05 DIAGNOSIS — E78.2 MIXED HYPERLIPIDEMIA: ICD-10-CM

## 2024-01-05 PROCEDURE — 1123F ACP DISCUSS/DSCN MKR DOCD: CPT | Performed by: FAMILY MEDICINE

## 2024-01-05 PROCEDURE — G8420 CALC BMI NORM PARAMETERS: HCPCS | Performed by: FAMILY MEDICINE

## 2024-01-05 PROCEDURE — 99212 OFFICE O/P EST SF 10 MIN: CPT | Performed by: FAMILY MEDICINE

## 2024-01-05 PROCEDURE — G8427 DOCREV CUR MEDS BY ELIG CLIN: HCPCS | Performed by: FAMILY MEDICINE

## 2024-01-05 PROCEDURE — 1036F TOBACCO NON-USER: CPT | Performed by: FAMILY MEDICINE

## 2024-01-05 PROCEDURE — G8484 FLU IMMUNIZE NO ADMIN: HCPCS | Performed by: FAMILY MEDICINE

## 2024-01-05 PROCEDURE — 1090F PRES/ABSN URINE INCON ASSESS: CPT | Performed by: FAMILY MEDICINE

## 2024-01-05 PROCEDURE — 99214 OFFICE O/P EST MOD 30 MIN: CPT | Performed by: FAMILY MEDICINE

## 2024-01-05 RX ORDER — LOSARTAN POTASSIUM 50 MG/1
50 TABLET ORAL 2 TIMES DAILY
Qty: 90 TABLET | Refills: 1 | Status: SHIPPED | OUTPATIENT
Start: 2024-01-05

## 2024-01-05 ASSESSMENT — PATIENT HEALTH QUESTIONNAIRE - PHQ9
SUM OF ALL RESPONSES TO PHQ9 QUESTIONS 1 & 2: 0
SUM OF ALL RESPONSES TO PHQ QUESTIONS 1-9: 0
2. FEELING DOWN, DEPRESSED OR HOPELESS: 0
SUM OF ALL RESPONSES TO PHQ QUESTIONS 1-9: 0
1. LITTLE INTEREST OR PLEASURE IN DOING THINGS: 0
SUM OF ALL RESPONSES TO PHQ QUESTIONS 1-9: 0
SUM OF ALL RESPONSES TO PHQ QUESTIONS 1-9: 0

## 2024-01-05 NOTE — PROGRESS NOTES
HPI:  Patient comes in today for   Chief Complaint   Patient presents with    Hypertension     1 month follow up   Here for  f/u  h/o HTN and Hyperlipidemia not on medications.h/o Macular deneration both eyes follows with opthalmology vision is poor.  H/o CKD is stable.Denies any chest pain or shortness of breath.No new complaints.Was hopsitalized in 11/2023 with UTI no complaints currently.Her chronic diarrhea is improved with diet changes  HISTORY:  Past Medical History:   Diagnosis Date    Allergic rhinitis     Anxiety     Chronic kidney disease 10/18/2016    COPD (chronic obstructive pulmonary disease) (HCC) 7/1/2019    Family history of colon cancer     Glaucoma     History of elevated lipids     History of fatigue     History of mitral valve prolapse     Hyperlipidemia     Hx of    Macular degeneration     Restless legs syndrome        Past Surgical History:   Procedure Laterality Date    BREAST BIOPSY Left     CARPAL TUNNEL RELEASE      COLONOSCOPY  09/08/09    -repeat 5 years    EYE SURGERY  2012    Dr Huston    HYSTERECTOMY (CERVIX STATUS UNKNOWN)      SINUS SURGERY      x2    TOE SURGERY          Family History   Problem Relation Age of Onset    Cancer Brother         colon       Social History     Socioeconomic History    Marital status: Unknown     Spouse name: Not on file    Number of children: Not on file    Years of education: Not on file    Highest education level: Not on file   Occupational History    Not on file   Tobacco Use    Smoking status: Never    Smokeless tobacco: Never   Substance and Sexual Activity    Alcohol use: No    Drug use: No    Sexual activity: Not on file   Other Topics Concern    Not on file   Social History Narrative    Not on file     Social Determinants of Health     Financial Resource Strain: Low Risk  (3/6/2023)    Overall Financial Resource Strain (CARDIA)     Difficulty of Paying Living Expenses: Not very hard   Food Insecurity: Not on file (3/6/2023)

## 2024-01-22 ENCOUNTER — OFFICE VISIT (OUTPATIENT)
Dept: FAMILY MEDICINE CLINIC | Age: 89
End: 2024-01-22
Payer: MEDICARE

## 2024-01-22 VITALS
SYSTOLIC BLOOD PRESSURE: 162 MMHG | DIASTOLIC BLOOD PRESSURE: 90 MMHG | BODY MASS INDEX: 21.16 KG/M2 | HEIGHT: 62 IN | OXYGEN SATURATION: 100 % | WEIGHT: 115 LBS | HEART RATE: 64 BPM

## 2024-01-22 DIAGNOSIS — E78.2 MIXED HYPERLIPIDEMIA: ICD-10-CM

## 2024-01-22 DIAGNOSIS — I10 PRIMARY HYPERTENSION: Primary | ICD-10-CM

## 2024-01-22 DIAGNOSIS — N18.31 STAGE 3A CHRONIC KIDNEY DISEASE (HCC): ICD-10-CM

## 2024-01-22 PROCEDURE — G8420 CALC BMI NORM PARAMETERS: HCPCS | Performed by: FAMILY MEDICINE

## 2024-01-22 PROCEDURE — 1090F PRES/ABSN URINE INCON ASSESS: CPT | Performed by: FAMILY MEDICINE

## 2024-01-22 PROCEDURE — 99212 OFFICE O/P EST SF 10 MIN: CPT | Performed by: FAMILY MEDICINE

## 2024-01-22 PROCEDURE — 1123F ACP DISCUSS/DSCN MKR DOCD: CPT | Performed by: FAMILY MEDICINE

## 2024-01-22 PROCEDURE — G8427 DOCREV CUR MEDS BY ELIG CLIN: HCPCS | Performed by: FAMILY MEDICINE

## 2024-01-22 PROCEDURE — G8484 FLU IMMUNIZE NO ADMIN: HCPCS | Performed by: FAMILY MEDICINE

## 2024-01-22 PROCEDURE — 99214 OFFICE O/P EST MOD 30 MIN: CPT | Performed by: FAMILY MEDICINE

## 2024-01-22 PROCEDURE — 1036F TOBACCO NON-USER: CPT | Performed by: FAMILY MEDICINE

## 2024-01-22 RX ORDER — CLONIDINE HYDROCHLORIDE 0.1 MG/1
0.1 TABLET ORAL DAILY PRN
Qty: 30 TABLET | Refills: 0 | Status: SHIPPED | OUTPATIENT
Start: 2024-01-22

## 2024-01-22 RX ORDER — LOSARTAN POTASSIUM 100 MG/1
100 TABLET ORAL DAILY
Qty: 90 TABLET | Refills: 1 | Status: SHIPPED | OUTPATIENT
Start: 2024-01-22

## 2024-01-22 NOTE — PROGRESS NOTES
HPI:  Patient comes in today for   Chief Complaint   Patient presents with    Hypertension     Pt is here for hypertension. Pt states her BP has been high, in the 150-160s. Pt states she has been taking extra medicaiton to get her BP down.    Here for  f/u  h/o HTN her BP has been running  high at home is on Cozzar and atenolol no chest apin or shortness of breath ,no leg swelling.h/o  Hyperlipidemia not on medications.h/o Macular deneration both eyes follows with opthalmology vision is poor.  H/o CKD is stable.Denies any chest pain or shortness of breath.No new complaints.Was hopsitalized in 11/2023 with UTI no complaints currently.Her chronic diarrhea is improved with diet changes  HISTORY:  Past Medical History:   Diagnosis Date    Allergic rhinitis     Anxiety     Chronic kidney disease 10/18/2016    COPD (chronic obstructive pulmonary disease) ([facility]) 7/1/2019    Family history of colon cancer     Glaucoma     History of elevated lipids     History of fatigue     History of mitral valve prolapse     Hyperlipidemia     Hx of    Macular degeneration     Restless legs syndrome        Past Surgical History:   Procedure Laterality Date    BREAST BIOPSY Left     CARPAL TUNNEL RELEASE      COLONOSCOPY  09/08/09    -repeat 5 years    EYE SURGERY  2012    Dr Huston    HYSTERECTOMY (CERVIX STATUS UNKNOWN)      SINUS SURGERY      x2    TOE SURGERY          Family History   Problem Relation Age of Onset    Cancer Brother         colon       Social History     Socioeconomic History    Marital status: Unknown     Spouse name: Not on file    Number of children: Not on file    Years of education: Not on file    Highest education level: Not on file   Occupational History    Not on file   Tobacco Use    Smoking status: Never    Smokeless tobacco: Never   Substance and Sexual Activity    Alcohol use: No    Drug use: No    Sexual activity: Not on file   Other Topics Concern    Not on file   Social History Narrative    Not

## 2024-02-23 ENCOUNTER — OFFICE VISIT (OUTPATIENT)
Dept: FAMILY MEDICINE CLINIC | Age: 89
End: 2024-02-23
Payer: MEDICARE

## 2024-02-23 VITALS
WEIGHT: 115 LBS | OXYGEN SATURATION: 97 % | HEART RATE: 74 BPM | BODY MASS INDEX: 21.03 KG/M2 | SYSTOLIC BLOOD PRESSURE: 156 MMHG | DIASTOLIC BLOOD PRESSURE: 84 MMHG

## 2024-02-23 DIAGNOSIS — I10 PRIMARY HYPERTENSION: Primary | ICD-10-CM

## 2024-02-23 DIAGNOSIS — E78.2 MIXED HYPERLIPIDEMIA: ICD-10-CM

## 2024-02-23 DIAGNOSIS — N18.31 STAGE 3A CHRONIC KIDNEY DISEASE (HCC): ICD-10-CM

## 2024-02-23 DIAGNOSIS — H35.3230 BILATERAL EXUDATIVE AGE-RELATED MACULAR DEGENERATION, UNSPECIFIED STAGE (HCC): ICD-10-CM

## 2024-02-23 PROCEDURE — G8484 FLU IMMUNIZE NO ADMIN: HCPCS | Performed by: FAMILY MEDICINE

## 2024-02-23 PROCEDURE — 1036F TOBACCO NON-USER: CPT | Performed by: FAMILY MEDICINE

## 2024-02-23 PROCEDURE — 1090F PRES/ABSN URINE INCON ASSESS: CPT | Performed by: FAMILY MEDICINE

## 2024-02-23 PROCEDURE — 99214 OFFICE O/P EST MOD 30 MIN: CPT | Performed by: FAMILY MEDICINE

## 2024-02-23 PROCEDURE — 99212 OFFICE O/P EST SF 10 MIN: CPT | Performed by: FAMILY MEDICINE

## 2024-02-23 PROCEDURE — G8420 CALC BMI NORM PARAMETERS: HCPCS | Performed by: FAMILY MEDICINE

## 2024-02-23 PROCEDURE — G8427 DOCREV CUR MEDS BY ELIG CLIN: HCPCS | Performed by: FAMILY MEDICINE

## 2024-02-23 PROCEDURE — 1123F ACP DISCUSS/DSCN MKR DOCD: CPT | Performed by: FAMILY MEDICINE

## 2024-02-23 RX ORDER — CLONIDINE HYDROCHLORIDE 0.1 MG/1
0.1 TABLET ORAL DAILY PRN
Qty: 30 TABLET | Refills: 2 | Status: SHIPPED | OUTPATIENT
Start: 2024-02-23

## 2024-02-23 NOTE — PROGRESS NOTES
HPI:  Patient comes in today for   Chief Complaint   Patient presents with    Hypertension     States bp is still elevated at 10 am 168/77 arm cuff and 175/93 with wrist   Here for  f/u  h/o HTN her BP has been running  high at home is on Cozzar and atenolol no chest pain or shortness of breath ,no leg swelling.h/o  Hyperlipidemia not on medications.h/o Macular deneration both eyes follows with opthalmology vision is poor.  H/o CKD is stable.Denies any chest pain or shortness of breath.No new complaints.Was hopsitalized in 11/2023 with UTI no complaints currently.Her chronic diarrhea is improved with diet changes  HISTORY:  Past Medical History:   Diagnosis Date    Allergic rhinitis     Anxiety     Chronic kidney disease 10/18/2016    COPD (chronic obstructive pulmonary disease) ([facility]) 7/1/2019    Family history of colon cancer     Glaucoma     History of elevated lipids     History of fatigue     History of mitral valve prolapse     Hyperlipidemia     Hx of    Macular degeneration     Restless legs syndrome        Past Surgical History:   Procedure Laterality Date    BREAST BIOPSY Left     CARPAL TUNNEL RELEASE      COLONOSCOPY  09/08/09    -repeat 5 years    EYE SURGERY  2012    Dr Huston    HYSTERECTOMY (CERVIX STATUS UNKNOWN)      SINUS SURGERY      x2    TOE SURGERY          Family History   Problem Relation Age of Onset    Cancer Brother         colon       Social History     Socioeconomic History    Marital status: Unknown     Spouse name: Not on file    Number of children: Not on file    Years of education: Not on file    Highest education level: Not on file   Occupational History    Not on file   Tobacco Use    Smoking status: Never    Smokeless tobacco: Never   Substance and Sexual Activity    Alcohol use: No    Drug use: No    Sexual activity: Not on file   Other Topics Concern    Not on file   Social History Narrative    Not on file     Social Determinants of Health     Financial Resource Strain:

## 2024-03-20 DIAGNOSIS — I10 PRIMARY HYPERTENSION: ICD-10-CM

## 2024-03-20 NOTE — TELEPHONE ENCOUNTER
Iram Monte is requesting a refill on the following medication(s):  Requested Prescriptions     Pending Prescriptions Disp Refills    atenolol (TENORMIN) 50 MG tablet [Pharmacy Med Name: Atenolol 50 MG Oral Tablet] 180 tablet 0     Sig: Take 1 tablet by mouth twice daily       Last Visit Date (If Applicable):  2/23/2024    Next Visit Date:    5/20/2024

## 2024-03-21 DIAGNOSIS — I10 PRIMARY HYPERTENSION: ICD-10-CM

## 2024-03-21 RX ORDER — ATENOLOL 50 MG/1
50 TABLET ORAL 2 TIMES DAILY
Qty: 180 TABLET | Refills: 0 | Status: SHIPPED | OUTPATIENT
Start: 2024-03-21

## 2024-03-22 DIAGNOSIS — I10 PRIMARY HYPERTENSION: ICD-10-CM

## 2024-03-27 ENCOUNTER — OFFICE VISIT (OUTPATIENT)
Dept: PODIATRY | Age: 89
End: 2024-03-27
Payer: MEDICARE

## 2024-03-27 VITALS
RESPIRATION RATE: 20 BRPM | SYSTOLIC BLOOD PRESSURE: 134 MMHG | WEIGHT: 114.8 LBS | HEART RATE: 76 BPM | BODY MASS INDEX: 21 KG/M2 | DIASTOLIC BLOOD PRESSURE: 72 MMHG

## 2024-03-27 DIAGNOSIS — L60.2 OG (ONYCHOGRYPHOSIS): ICD-10-CM

## 2024-03-27 DIAGNOSIS — B35.1 DERMATOPHYTOSIS OF NAIL: ICD-10-CM

## 2024-03-27 DIAGNOSIS — N18.30 STAGE 3 CHRONIC KIDNEY DISEASE, UNSPECIFIED WHETHER STAGE 3A OR 3B CKD (HCC): Primary | ICD-10-CM

## 2024-03-27 PROCEDURE — 11721 DEBRIDE NAIL 6 OR MORE: CPT | Performed by: PODIATRIST

## 2024-03-27 PROCEDURE — 99999 PR OFFICE/OUTPT VISIT,PROCEDURE ONLY: CPT | Performed by: PODIATRIST

## 2024-03-27 RX ORDER — LOSARTAN POTASSIUM 25 MG/1
25 TABLET ORAL 2 TIMES DAILY
Qty: 180 TABLET | Refills: 0 | OUTPATIENT
Start: 2024-03-27

## 2024-03-27 NOTE — TELEPHONE ENCOUNTER
Iram Monte is requesting a refill on the following medication(s):  Requested Prescriptions     Pending Prescriptions Disp Refills    losartan (COZAAR) 25 MG tablet [Pharmacy Med Name: Losartan Potassium 25 MG Oral Tablet] 180 tablet 0     Sig: Take 1 tablet by mouth twice daily       Last Visit Date (If Applicable):  2/23/2024    Next Visit Date:    5/20/2024

## 2024-03-27 NOTE — PROGRESS NOTES
Foot Care Worksheet  PCP: Bubba Mccrary MD  Last visit: 02 / 23 / 2024    Nail description: Thick , Yellow , Crumbly , Marked limitation of ambulation     Pain resulting from thickened and dystrophy of nail plate Yes    Nails involved  Right   1, 2, 3, 4, 5  (T5-T9)  Left     1, 5  (TA-T4)    Q7 1 Class A Finding - Non traumatic amputation of foot No    Q8 2 Class B Findings - Absent DP pulse No, Absent PT pulse No, Advanced tropic changes (3 required) Yes    Decrease hair growth Yes, Nail changes/thickening Yes, Pigmented changes/discoloration Yes, Skin texture (thin, shiny) No, Skin color (rubor/redness) No    Q9 1 Class B and 2 Class C Findings  Claudication No, Temperature change Yes, Paresthesia No, Burning No, Edema Yes    Subjective:  Iram Monte is a 89 y.o. female who presents to the office today for foot care.   Currently denies F/C/N/V.     Allergies   Allergen Reactions    Beconase [Beclomethasone]     Benadryl [Diphenhydramine]     Bicillin C-R [Penicillin G Benzathine & Proc]     Chloramphenicols     Ephedrine     Niacin And Related     Other      Flu Vaccine  Pseudophedrine  Chlordrine  Chlorpeniramine    Penicillins     Phenergan [Promethazine Hcl]     Phenylpropanolamine     Propacetamol     Proventil [Albuterol]     Seldane [Terfenadine]     Timoptic [Timolol Maleate]     Triamcinolone        Past Medical History:   Diagnosis Date    Allergic rhinitis     Anxiety     Chronic kidney disease 10/18/2016    COPD (chronic obstructive pulmonary disease) ([facility]) 7/1/2019    Family history of colon cancer     Glaucoma     History of elevated lipids     History of fatigue     History of mitral valve prolapse     Hyperlipidemia     Hx of    Macular degeneration     Restless legs syndrome        Prior to Admission medications    Medication Sig Start Date End Date Taking? Authorizing Provider   atenolol (TENORMIN) 50 MG tablet Take 1 tablet by mouth twice daily 3/21/24  Yes Bubba Mccarry

## 2024-04-02 RX ORDER — ATENOLOL 50 MG/1
50 TABLET ORAL 2 TIMES DAILY
Qty: 180 TABLET | Refills: 0 | OUTPATIENT
Start: 2024-04-02

## 2024-05-07 ENCOUNTER — TELEPHONE (OUTPATIENT)
Dept: FAMILY MEDICINE CLINIC | Age: 89
End: 2024-05-07

## 2024-05-07 NOTE — TELEPHONE ENCOUNTER
Pt presented to the window with a note and Rexulti on it. Pt stated that her daughter would like her to start on Rexulti. Pt daughter stated that it would help pt aggression, agitation, and her dementia/Alzheimer's. Pt would like [facility] in [place]. Please advise.

## 2024-05-08 ENCOUNTER — OFFICE VISIT (OUTPATIENT)
Dept: FAMILY MEDICINE CLINIC | Age: 89
End: 2024-05-08
Payer: MEDICARE

## 2024-05-08 VITALS
SYSTOLIC BLOOD PRESSURE: 120 MMHG | HEART RATE: 68 BPM | DIASTOLIC BLOOD PRESSURE: 78 MMHG | OXYGEN SATURATION: 98 % | BODY MASS INDEX: 20.06 KG/M2 | WEIGHT: 109 LBS | HEIGHT: 62 IN

## 2024-05-08 DIAGNOSIS — I10 PRIMARY HYPERTENSION: ICD-10-CM

## 2024-05-08 DIAGNOSIS — F03.B18 MODERATE DEMENTIA WITH OTHER BEHAVIORAL DISTURBANCE, UNSPECIFIED DEMENTIA TYPE (HCC): Primary | ICD-10-CM

## 2024-05-08 DIAGNOSIS — F32.A DEPRESSION, UNSPECIFIED DEPRESSION TYPE: ICD-10-CM

## 2024-05-08 DIAGNOSIS — F03.B18 MODERATE DEMENTIA WITH OTHER BEHAVIORAL DISTURBANCE, UNSPECIFIED DEMENTIA TYPE (HCC): ICD-10-CM

## 2024-05-08 PROBLEM — G30.9 ALZHEIMER'S DISEASE, UNSPECIFIED (CODE) (HCC): Status: ACTIVE | Noted: 2024-05-08

## 2024-05-08 PROCEDURE — 99212 OFFICE O/P EST SF 10 MIN: CPT | Performed by: FAMILY MEDICINE

## 2024-05-08 RX ORDER — CITALOPRAM 20 MG/1
20 TABLET ORAL DAILY
Qty: 30 TABLET | Refills: 3 | Status: SHIPPED | OUTPATIENT
Start: 2024-05-08

## 2024-05-08 RX ORDER — DONEPEZIL HYDROCHLORIDE 5 MG/1
5 TABLET, FILM COATED ORAL NIGHTLY
Qty: 30 TABLET | Refills: 3 | Status: SHIPPED | OUTPATIENT
Start: 2024-05-08

## 2024-05-08 NOTE — PROGRESS NOTES
HPI:  Patient comes in today for   Chief Complaint   Patient presents with    Dementia     Pt is here to talk about the medication Rexulti that her daughter recommended for dementia    Here for  f/u as per family is getting increasingly angry and agitated and has had cognitive decline on going for sometime.Sleep is interrupted at night has  depression was on zolft in past but quit since she did not like it.No urinary complaints or cough. h/o HTN her BP has been fluctuant  is on Cozzar and atenolol no chest pain or shortness of breath ,no leg swelling.h/o  Hyperlipidemia not on medications.h/o Macular deneration both eyes follows with opthalmology vision is poor.H/o CKD is stable.Denies any chest pain or shortness of breath.  HISTORY:  Past Medical History:   Diagnosis Date    Allergic rhinitis     Anxiety     Chronic kidney disease 10/18/2016    COPD (chronic obstructive pulmonary disease) (HCC) 7/1/2019    Family history of colon cancer     Glaucoma     History of elevated lipids     History of fatigue     History of mitral valve prolapse     Hyperlipidemia     Hx of    Macular degeneration     Restless legs syndrome        Past Surgical History:   Procedure Laterality Date    BREAST BIOPSY Left     CARPAL TUNNEL RELEASE      COLONOSCOPY  09/08/09    -repeat 5 years    EYE SURGERY  2012    Dr Huston    HYSTERECTOMY (CERVIX STATUS UNKNOWN)      SINUS SURGERY      x2    TOE SURGERY          Family History   Problem Relation Age of Onset    Cancer Brother         colon       Social History     Socioeconomic History    Marital status: Unknown     Spouse name: Not on file    Number of children: Not on file    Years of education: Not on file    Highest education level: Not on file   Occupational History    Not on file   Tobacco Use    Smoking status: Never    Smokeless tobacco: Never   Substance and Sexual Activity    Alcohol use: No    Drug use: No    Sexual activity: Not on file   Other Topics Concern    Not

## 2024-05-09 LAB
ALBUMIN/GLOBULIN RATIO: 1.4 G/DL
ALBUMIN: 4.1 G/DL (ref 3.5–5)
ALP BLD-CCNC: 70 UNITS/L (ref 38–126)
ALT SERPL-CCNC: 15 UNITS/L (ref 4–35)
ANION GAP SERPL CALCULATED.3IONS-SCNC: 6.6 MMOL/L (ref 3–11)
AST SERPL-CCNC: 28 UNITS/L (ref 14–36)
BASOPHILS ABSOLUTE: 0.05 (ref 0–0.3)
BASOPHILS RELATIVE PERCENT: 0.75 (ref 0–3)
BILIRUB SERPL-MCNC: 0.5 MG/DL (ref 0.2–1.3)
BUN BLDV-MCNC: 27 MG/DL (ref 7–17)
CALCIUM SERPL-MCNC: 9.6 MG/DL (ref 8.4–10.2)
CHLORIDE BLD-SCNC: 110 MMOL/L (ref 98–120)
CO2: 24 MMOL/L (ref 22–31)
CREAT SERPL-MCNC: 1.4 MG/DL (ref 0.5–1)
EOSINOPHILS ABSOLUTE: 0.18 (ref 0–1.1)
EOSINOPHILS RELATIVE PERCENT: 2.63 (ref 0–10)
GFR, ESTIMATED: 37.5
GLOBULIN: 3 G/DL
GLUCOSE: 104 MG/DL (ref 65–105)
HCT VFR BLD CALC: 39.3 % (ref 37–47)
HEMOGLOBIN: 11.9 (ref 12–16)
LYMPHOCYTES ABSOLUTE: 2.29 (ref 1–5.5)
LYMPHOCYTES RELATIVE PERCENT: 34.41 (ref 20–51.1)
MCH RBC QN AUTO: 29.4 PG (ref 28.5–32.5)
MCHC RBC AUTO-ENTMCNC: 30.3 G/DL (ref 32–37)
MCV RBC AUTO: 97 FL (ref 80–94)
MONOCYTES ABSOLUTE: 0.77 (ref 0.1–1)
MONOCYTES RELATIVE PERCENT: 11.49 (ref 1.7–9.3)
NEUTROPHILS ABSOLUTE: 3.38 (ref 2–8.1)
NEUTROPHILS RELATIVE PERCENT: 50.73 (ref 42.2–75.2)
PDW BLD-RTO: 13.1 % (ref 8.5–15.5)
PLATELET # BLD: 209.4 THOU/MM3 (ref 130–400)
POTASSIUM SERPL-SCNC: 4.9 MMOL/L (ref 3.6–5)
RBC # BLD: 4.06 M/UL (ref 4.2–5.4)
SODIUM BLD-SCNC: 140 MMOL/L (ref 135–145)
TOTAL PROTEIN: 7.1 G/DL (ref 6.3–8.2)
TSH SERPL DL<=0.05 MIU/L-ACNC: 3.32 MIU/ML (ref 0.49–4.67)
WBC # BLD: 6.7 THOU/ML3 (ref 4.8–10.8)

## 2024-06-05 ENCOUNTER — OFFICE VISIT (OUTPATIENT)
Dept: FAMILY MEDICINE CLINIC | Age: 89
End: 2024-06-05
Payer: MEDICARE

## 2024-06-05 VITALS
OXYGEN SATURATION: 98 % | BODY MASS INDEX: 19.88 KG/M2 | DIASTOLIC BLOOD PRESSURE: 68 MMHG | HEART RATE: 54 BPM | WEIGHT: 108 LBS | SYSTOLIC BLOOD PRESSURE: 102 MMHG | HEIGHT: 62 IN

## 2024-06-05 DIAGNOSIS — F03.911 AGITATION DUE TO DEMENTIA (HCC): ICD-10-CM

## 2024-06-05 DIAGNOSIS — F03.B18 MODERATE DEMENTIA WITH OTHER BEHAVIORAL DISTURBANCE, UNSPECIFIED DEMENTIA TYPE (HCC): Primary | ICD-10-CM

## 2024-06-05 DIAGNOSIS — I10 PRIMARY HYPERTENSION: ICD-10-CM

## 2024-06-05 PROCEDURE — 99212 OFFICE O/P EST SF 10 MIN: CPT | Performed by: FAMILY MEDICINE

## 2024-06-05 SDOH — ECONOMIC STABILITY: INCOME INSECURITY: HOW HARD IS IT FOR YOU TO PAY FOR THE VERY BASICS LIKE FOOD, HOUSING, MEDICAL CARE, AND HEATING?: NOT HARD AT ALL

## 2024-06-05 SDOH — ECONOMIC STABILITY: FOOD INSECURITY: WITHIN THE PAST 12 MONTHS, THE FOOD YOU BOUGHT JUST DIDN'T LAST AND YOU DIDN'T HAVE MONEY TO GET MORE.: NEVER TRUE

## 2024-06-05 SDOH — ECONOMIC STABILITY: FOOD INSECURITY: WITHIN THE PAST 12 MONTHS, YOU WORRIED THAT YOUR FOOD WOULD RUN OUT BEFORE YOU GOT MONEY TO BUY MORE.: NEVER TRUE

## 2024-06-05 NOTE — PROGRESS NOTES
Never    Smokeless tobacco: Never   Substance and Sexual Activity    Alcohol use: No    Drug use: No    Sexual activity: Not on file   Other Topics Concern    Not on file   Social History Narrative    Not on file     Social Determinants of Health     Financial Resource Strain: Low Risk  (6/5/2024)    Overall Financial Resource Strain (CARDIA)     Difficulty of Paying Living Expenses: Not hard at all   Food Insecurity: No Food Insecurity (6/5/2024)    Hunger Vital Sign     Worried About Running Out of Food in the Last Year: Never true     Ran Out of Food in the Last Year: Never true   Transportation Needs: Unknown (6/5/2024)    PRAPARE - Transportation     Lack of Transportation (Medical): Not on file     Lack of Transportation (Non-Medical): No   Physical Activity: Insufficiently Active (9/15/2023)    Exercise Vital Sign     Days of Exercise per Week: 7 days     Minutes of Exercise per Session: 20 min   Stress: Not on file   Social Connections: Not on file   Intimate Partner Violence: Not on file   Housing Stability: Unknown (6/5/2024)    Housing Stability Vital Sign     Unable to Pay for Housing in the Last Year: Not on file     Number of Places Lived in the Last Year: Not on file     Unstable Housing in the Last Year: No       Current Outpatient Medications   Medication Sig Dispense Refill    donepezil (ARICEPT) 5 MG tablet Take 1 tablet by mouth nightly 30 tablet 3    citalopram (CELEXA) 20 MG tablet Take 1 tablet by mouth daily 30 tablet 3    atenolol (TENORMIN) 50 MG tablet Take 1 tablet by mouth twice daily 180 tablet 0    cloNIDine (CATAPRES) 0.1 MG tablet Take 1 tablet by mouth daily as needed for High Blood Pressure (if SBP  is greater then 160) 30 tablet 2    losartan (COZAAR) 100 MG tablet Take 1 tablet by mouth daily 90 tablet 1    GENERIC EXTERNAL MEDICATION daily Patient reports taking Billberry daily      Ascorbic Acid (VITAMIN C) 100 MG tablet Take 1 tablet by mouth daily      Lutein 40 MG CAPS Take

## 2024-06-14 DIAGNOSIS — I10 PRIMARY HYPERTENSION: ICD-10-CM

## 2024-06-14 RX ORDER — ATENOLOL 50 MG/1
50 TABLET ORAL 2 TIMES DAILY
Qty: 180 TABLET | Refills: 0 | Status: SHIPPED | OUTPATIENT
Start: 2024-06-14

## 2024-07-08 RX ORDER — BREXPIPRAZOLE 0.5 MG/1
0.5 TABLET ORAL DAILY
Qty: 30 TABLET | Refills: 0 | Status: SHIPPED | OUTPATIENT
Start: 2024-07-08 | End: 2024-07-10 | Stop reason: SDUPTHER

## 2024-07-08 NOTE — TELEPHONE ENCOUNTER
Iram Monte is requesting a refill on the following medication(s):  Requested Prescriptions     Pending Prescriptions Disp Refills    REXULTI 0.5 MG TABS tablet [Pharmacy Med Name: Rexulti 0.5 MG Oral Tablet] 30 tablet 0     Sig: Take 1 tablet by mouth once daily       Last Visit Date (If Applicable):  6/5/2024    Next Visit Date:    7/10/2024

## 2024-07-10 ENCOUNTER — OFFICE VISIT (OUTPATIENT)
Dept: FAMILY MEDICINE CLINIC | Age: 89
End: 2024-07-10
Payer: MEDICARE

## 2024-07-10 VITALS
WEIGHT: 105 LBS | OXYGEN SATURATION: 100 % | HEIGHT: 62 IN | SYSTOLIC BLOOD PRESSURE: 94 MMHG | DIASTOLIC BLOOD PRESSURE: 60 MMHG | BODY MASS INDEX: 19.32 KG/M2 | HEART RATE: 50 BPM

## 2024-07-10 DIAGNOSIS — H35.3230 BILATERAL EXUDATIVE AGE-RELATED MACULAR DEGENERATION, UNSPECIFIED STAGE (HCC): ICD-10-CM

## 2024-07-10 DIAGNOSIS — F03.B18 MODERATE DEMENTIA WITH OTHER BEHAVIORAL DISTURBANCE, UNSPECIFIED DEMENTIA TYPE (HCC): Primary | ICD-10-CM

## 2024-07-10 DIAGNOSIS — F32.A DEPRESSION, UNSPECIFIED DEPRESSION TYPE: ICD-10-CM

## 2024-07-10 DIAGNOSIS — I10 PRIMARY HYPERTENSION: ICD-10-CM

## 2024-07-10 PROCEDURE — 99212 OFFICE O/P EST SF 10 MIN: CPT | Performed by: FAMILY MEDICINE

## 2024-07-10 PROCEDURE — 1036F TOBACCO NON-USER: CPT | Performed by: FAMILY MEDICINE

## 2024-07-10 RX ORDER — DONEPEZIL HYDROCHLORIDE 5 MG/1
5 TABLET, FILM COATED ORAL NIGHTLY
Qty: 90 TABLET | Refills: 1 | Status: SHIPPED | OUTPATIENT
Start: 2024-07-10

## 2024-07-10 RX ORDER — LOSARTAN POTASSIUM 100 MG/1
100 TABLET ORAL DAILY
Qty: 90 TABLET | Refills: 1 | Status: SHIPPED | OUTPATIENT
Start: 2024-07-10

## 2024-07-10 RX ORDER — CITALOPRAM 20 MG/1
20 TABLET ORAL DAILY
Qty: 90 TABLET | Refills: 1 | Status: SHIPPED | OUTPATIENT
Start: 2024-07-10

## 2024-07-10 NOTE — PROGRESS NOTES
HPI:  Patient comes in today for   Chief Complaint   Patient presents with    Dementia     Pt is here for a 1 month f/u. Pt  states that the rexulti is helping but it is too excessive for them.    Here for  f/u is  Aricept and celexa for dementia and depression was added on Rexulti for agitation is helping a lot no side effects ,sleep is also inprovedh/o HTN her BP has been stable is on Cozaar and atenolol, no chest pain or shortness of breath ,no leg swelling.h/o  Hyperlipidemia is not on medications.h/o Macular degeneration both eyes follows with opthalmology vision is poor.H/o CKD her recent labs show slight bump in her Bun/CR.Denies any chest pain or shortness of breath.  HISTORY:  Past Medical History:   Diagnosis Date    Allergic rhinitis     Anxiety     Chronic kidney disease 10/18/2016    COPD (chronic obstructive pulmonary disease) ([facility]) 7/1/2019    Family history of colon cancer     Glaucoma     History of elevated lipids     History of fatigue     History of mitral valve prolapse     Hyperlipidemia     Hx of    Macular degeneration     Restless legs syndrome        Past Surgical History:   Procedure Laterality Date    BREAST BIOPSY Left     CARPAL TUNNEL RELEASE      COLONOSCOPY  09/08/09    -repeat 5 years    EYE SURGERY  2012    Dr Huston    HYSTERECTOMY (CERVIX STATUS UNKNOWN)      SINUS SURGERY      x2    TOE SURGERY          Family History   Problem Relation Age of Onset    Cancer Brother         colon       Social History     Socioeconomic History    Marital status: Unknown     Spouse name: Not on file    Number of children: Not on file    Years of education: Not on file    Highest education level: Not on file   Occupational History    Not on file   Tobacco Use    Smoking status: Never    Smokeless tobacco: Never   Substance and Sexual Activity    Alcohol use: No    Drug use: No    Sexual activity: Not on file   Other Topics Concern    Not on file   Social History Narrative    Not on

## 2024-08-14 ENCOUNTER — OFFICE VISIT (OUTPATIENT)
Dept: PODIATRY | Age: 89
End: 2024-08-14
Payer: MEDICARE

## 2024-08-14 VITALS
HEIGHT: 62 IN | WEIGHT: 109 LBS | SYSTOLIC BLOOD PRESSURE: 120 MMHG | BODY MASS INDEX: 20.06 KG/M2 | DIASTOLIC BLOOD PRESSURE: 70 MMHG | HEART RATE: 74 BPM

## 2024-08-14 DIAGNOSIS — L60.2 OG (ONYCHOGRYPHOSIS): ICD-10-CM

## 2024-08-14 DIAGNOSIS — B35.1 DERMATOPHYTOSIS OF NAIL: ICD-10-CM

## 2024-08-14 DIAGNOSIS — L84 CORNS AND CALLOSITIES: ICD-10-CM

## 2024-08-14 DIAGNOSIS — N18.30 STAGE 3 CHRONIC KIDNEY DISEASE, UNSPECIFIED WHETHER STAGE 3A OR 3B CKD (HCC): Primary | ICD-10-CM

## 2024-08-14 PROCEDURE — 11721 DEBRIDE NAIL 6 OR MORE: CPT | Performed by: PODIATRIST

## 2024-08-14 PROCEDURE — 99999 PR OFFICE/OUTPT VISIT,PROCEDURE ONLY: CPT | Performed by: PODIATRIST

## 2024-08-14 PROCEDURE — 11056 PARNG/CUTG B9 HYPRKR LES 2-4: CPT | Performed by: PODIATRIST

## 2024-08-14 NOTE — PROGRESS NOTES
citalopram (CELEXA) 20 MG tablet Take 1 tablet by mouth daily 7/10/24  Yes Bubba Mccrary MD   donepezil (ARICEPT) 5 MG tablet Take 1 tablet by mouth nightly 7/10/24  Yes Bubba Mccrary MD   brexpiprazole (REXULTI) 0.5 MG TABS tablet Take 1 tablet by mouth daily 7/10/24  Yes Bubba Mccrary MD   atenolol (TENORMIN) 50 MG tablet Take 1 tablet by mouth twice daily 6/14/24  Yes Bubba Mccrary MD   cloNIDine (CATAPRES) 0.1 MG tablet Take 1 tablet by mouth daily as needed for High Blood Pressure (if SBP  is greater then 160) 2/23/24  Yes Bubba Mccrary MD   GENERIC EXTERNAL MEDICATION daily Patient reports taking Billberry daily   Yes Arjun Fernandez MD   Ascorbic Acid (VITAMIN C) 100 MG tablet Take 1 tablet by mouth daily   Yes Arjun Fernandez MD   Lutein 40 MG CAPS Take  by mouth. #1tab bid   Yes Arjun Fernandez MD       Past Surgical History:   Procedure Laterality Date    BREAST BIOPSY Left     CARPAL TUNNEL RELEASE      COLONOSCOPY  09/08/09    -repeat 5 years    EYE SURGERY  2012    Dr Husotn    HYSTERECTOMY (CERVIX STATUS UNKNOWN)      SINUS SURGERY      x2    TOE SURGERY         Family History   Problem Relation Age of Onset    Cancer Brother         colon       Social History     Tobacco Use    Smoking status: Never    Smokeless tobacco: Never   Substance Use Topics    Alcohol use: No       ROS: All 14 ROS systems reviewed and pertinent positives noted above, all others negative.    Lower Extremity Physical Examination:     Vitals:   There were no vitals filed for this visit.      General: AAO x 3 in NAD.     Vascular: DP and PT pulses palpable 2/4, bilateral.  CFT <3 seconds, bilateral.  Hair growth absent to the level of the digits, bilateral.  Edema present, bilateral.  Varicosities present, bilateral. Erythema absent, bilateral. Distal Rubor absent bilateral.  Temperature decreased bilateral. Hyperpigmentation present bilateral. Atrophic

## 2024-09-08 DIAGNOSIS — I10 PRIMARY HYPERTENSION: ICD-10-CM

## 2024-09-09 RX ORDER — ATENOLOL 50 MG/1
50 TABLET ORAL 2 TIMES DAILY
Qty: 180 TABLET | Refills: 0 | Status: SHIPPED | OUTPATIENT
Start: 2024-09-09

## 2024-09-16 ENCOUNTER — OFFICE VISIT (OUTPATIENT)
Dept: FAMILY MEDICINE CLINIC | Age: 89
End: 2024-09-16
Payer: MEDICARE

## 2024-09-16 VITALS
HEIGHT: 62 IN | SYSTOLIC BLOOD PRESSURE: 150 MMHG | HEART RATE: 65 BPM | OXYGEN SATURATION: 100 % | DIASTOLIC BLOOD PRESSURE: 90 MMHG | WEIGHT: 107 LBS | BODY MASS INDEX: 19.69 KG/M2

## 2024-09-16 DIAGNOSIS — M25.561 RIGHT KNEE PAIN, UNSPECIFIED CHRONICITY: ICD-10-CM

## 2024-09-16 DIAGNOSIS — Z23 NEED FOR INFLUENZA VACCINATION: ICD-10-CM

## 2024-09-16 DIAGNOSIS — F03.B18 MODERATE DEMENTIA WITH OTHER BEHAVIORAL DISTURBANCE, UNSPECIFIED DEMENTIA TYPE (HCC): ICD-10-CM

## 2024-09-16 DIAGNOSIS — R26.89 BALANCE PROBLEM: ICD-10-CM

## 2024-09-16 DIAGNOSIS — I10 PRIMARY HYPERTENSION: ICD-10-CM

## 2024-09-16 DIAGNOSIS — Z00.00 MEDICARE ANNUAL WELLNESS VISIT, SUBSEQUENT: ICD-10-CM

## 2024-09-16 PROCEDURE — 1090F PRES/ABSN URINE INCON ASSESS: CPT | Performed by: FAMILY MEDICINE

## 2024-09-16 PROCEDURE — 1036F TOBACCO NON-USER: CPT | Performed by: FAMILY MEDICINE

## 2024-09-16 PROCEDURE — G8420 CALC BMI NORM PARAMETERS: HCPCS | Performed by: FAMILY MEDICINE

## 2024-09-16 PROCEDURE — 99214 OFFICE O/P EST MOD 30 MIN: CPT | Performed by: FAMILY MEDICINE

## 2024-09-16 PROCEDURE — G0439 PPPS, SUBSEQ VISIT: HCPCS | Performed by: FAMILY MEDICINE

## 2024-09-16 PROCEDURE — 90653 IIV ADJUVANT VACCINE IM: CPT | Performed by: FAMILY MEDICINE

## 2024-09-16 PROCEDURE — PBSHW INFLUENZA, FLUAD TRIVALENT, (AGE 65 Y+), IM, PRESERVATIVE FREE, 0.5ML: Performed by: FAMILY MEDICINE

## 2024-09-16 PROCEDURE — 99211 OFF/OP EST MAY X REQ PHY/QHP: CPT | Performed by: FAMILY MEDICINE

## 2024-09-16 PROCEDURE — G8427 DOCREV CUR MEDS BY ELIG CLIN: HCPCS | Performed by: FAMILY MEDICINE

## 2024-09-16 PROCEDURE — 1123F ACP DISCUSS/DSCN MKR DOCD: CPT | Performed by: FAMILY MEDICINE

## 2024-09-16 RX ORDER — SODIUM BICARBONATE 650 MG/1
650 TABLET ORAL 2 TIMES DAILY
COMMUNITY

## 2024-09-16 ASSESSMENT — PATIENT HEALTH QUESTIONNAIRE - PHQ9
1. LITTLE INTEREST OR PLEASURE IN DOING THINGS: NEARLY EVERY DAY
4. FEELING TIRED OR HAVING LITTLE ENERGY: MORE THAN HALF THE DAYS
SUM OF ALL RESPONSES TO PHQ QUESTIONS 1-9: 8
6. FEELING BAD ABOUT YOURSELF - OR THAT YOU ARE A FAILURE OR HAVE LET YOURSELF OR YOUR FAMILY DOWN: NOT AT ALL
SUM OF ALL RESPONSES TO PHQ9 QUESTIONS 1 & 2: 3
2. FEELING DOWN, DEPRESSED OR HOPELESS: NOT AT ALL
SUM OF ALL RESPONSES TO PHQ QUESTIONS 1-9: 8
8. MOVING OR SPEAKING SO SLOWLY THAT OTHER PEOPLE COULD HAVE NOTICED. OR THE OPPOSITE, BEING SO FIGETY OR RESTLESS THAT YOU HAVE BEEN MOVING AROUND A LOT MORE THAN USUAL: NOT AT ALL
7. TROUBLE CONCENTRATING ON THINGS, SUCH AS READING THE NEWSPAPER OR WATCHING TELEVISION: NEARLY EVERY DAY
SUM OF ALL RESPONSES TO PHQ QUESTIONS 1-9: 8
5. POOR APPETITE OR OVEREATING: NOT AT ALL
9. THOUGHTS THAT YOU WOULD BE BETTER OFF DEAD, OR OF HURTING YOURSELF: NOT AT ALL
SUM OF ALL RESPONSES TO PHQ QUESTIONS 1-9: 8
3. TROUBLE FALLING OR STAYING ASLEEP: NOT AT ALL
10. IF YOU CHECKED OFF ANY PROBLEMS, HOW DIFFICULT HAVE THESE PROBLEMS MADE IT FOR YOU TO DO YOUR WORK, TAKE CARE OF THINGS AT HOME, OR GET ALONG WITH OTHER PEOPLE: SOMEWHAT DIFFICULT

## 2024-09-24 ENCOUNTER — TELEPHONE (OUTPATIENT)
Dept: FAMILY MEDICINE CLINIC | Age: 89
End: 2024-09-24

## 2024-09-24 DIAGNOSIS — R26.89 BALANCE PROBLEM: ICD-10-CM

## 2024-09-24 DIAGNOSIS — Z91.81 AT HIGH RISK FOR FALLS: Primary | ICD-10-CM

## 2024-10-11 ENCOUNTER — OFFICE VISIT (OUTPATIENT)
Dept: FAMILY MEDICINE CLINIC | Age: 89
End: 2024-10-11
Payer: MEDICARE

## 2024-10-11 VITALS
HEIGHT: 62 IN | BODY MASS INDEX: 20.09 KG/M2 | SYSTOLIC BLOOD PRESSURE: 92 MMHG | DIASTOLIC BLOOD PRESSURE: 60 MMHG | HEART RATE: 54 BPM | WEIGHT: 109.2 LBS | OXYGEN SATURATION: 99 %

## 2024-10-11 DIAGNOSIS — I10 PRIMARY HYPERTENSION: Primary | ICD-10-CM

## 2024-10-11 DIAGNOSIS — N18.31 STAGE 3A CHRONIC KIDNEY DISEASE (HCC): ICD-10-CM

## 2024-10-11 DIAGNOSIS — M17.11 PRIMARY OSTEOARTHRITIS OF RIGHT KNEE: ICD-10-CM

## 2024-10-11 DIAGNOSIS — F03.B18 MODERATE DEMENTIA WITH OTHER BEHAVIORAL DISTURBANCE, UNSPECIFIED DEMENTIA TYPE (HCC): ICD-10-CM

## 2024-10-11 PROCEDURE — 99211 OFF/OP EST MAY X REQ PHY/QHP: CPT | Performed by: FAMILY MEDICINE

## 2024-10-11 RX ORDER — CLONIDINE HYDROCHLORIDE 0.1 MG/1
0.1 TABLET ORAL DAILY PRN
Qty: 90 TABLET | Refills: 0 | Status: SHIPPED | OUTPATIENT
Start: 2024-10-11

## 2024-10-11 NOTE — PROGRESS NOTES
Supple without adenopathy or thyromegaly, no carotid bruits  LUNGS:  CTA all fields  HEART:  RRR without M, R, or G  ABDOMEN:  Soft and nontender without palpable abnormalities  EXTREMITIES: Right knee with joint line tenderness has arthritic changes is bigger the left ,no leg edema, no calf tenderness.  NEURO:  No focal deficits.  SKIN:  warm to touch,normal texture.No active lesions.    ASSESSMENT/PLAN:     Diagnosis Orders   1. Primary hypertension        2. Moderate dementia with other behavioral disturbance, unspecified dementia type (HCC)        3. Primary osteoarthritis of right knee        4. Stage 3a chronic kidney disease (HCC)                          No orders of the defined types were placed in this encounter.        Orders Placed This Encounter   Medications    cloNIDine (CATAPRES) 0.1 MG tablet     Sig: Take 1 tablet by mouth daily as needed for High Blood Pressure (if SBP  is greater then 160)     Dispense:  90 tablet     Refill:  0           Continue Aricept  for dementia.  Continue celexa for depression  Discussed side effects with patient and family members who were here today  Continue Losratan to 100 mg daily and continue atenolol use clonidine prn if SBP>160.  Monitor BP at home  F/U with Ophthalmology.  Tylenol prn pain,   Is not planning on surgery for her knee.  F/U  with Nephrology  Return in about 3 months (around 1/11/2025).    Electronically signed by Bubba Mccrary MD   On the basis of positive falls risk screening, assessment and plan is as follows: home safety tips provided.

## 2024-10-14 ENCOUNTER — TELEPHONE (OUTPATIENT)
Dept: FAMILY MEDICINE CLINIC | Age: 89
End: 2024-10-14

## 2024-10-14 DIAGNOSIS — R26.89 BALANCE PROBLEM: Primary | ICD-10-CM

## 2024-10-14 NOTE — TELEPHONE ENCOUNTER
Pts  states Marlon wants a outpatient therapy order, she completed in home therapy.  The order needs to be faxed to [phone].

## 2024-10-23 ENCOUNTER — OFFICE VISIT (OUTPATIENT)
Dept: PODIATRY | Age: 89
End: 2024-10-23
Payer: MEDICARE

## 2024-10-23 VITALS
RESPIRATION RATE: 20 BRPM | BODY MASS INDEX: 19.9 KG/M2 | WEIGHT: 108.8 LBS | HEART RATE: 60 BPM | SYSTOLIC BLOOD PRESSURE: 110 MMHG | DIASTOLIC BLOOD PRESSURE: 60 MMHG

## 2024-10-23 DIAGNOSIS — L84 CORNS AND CALLOSITIES: ICD-10-CM

## 2024-10-23 DIAGNOSIS — L60.2 OG (ONYCHOGRYPHOSIS): ICD-10-CM

## 2024-10-23 DIAGNOSIS — N18.30 STAGE 3 CHRONIC KIDNEY DISEASE, UNSPECIFIED WHETHER STAGE 3A OR 3B CKD (HCC): Primary | ICD-10-CM

## 2024-10-23 DIAGNOSIS — B35.1 DERMATOPHYTOSIS OF NAIL: ICD-10-CM

## 2024-10-23 PROCEDURE — 11721 DEBRIDE NAIL 6 OR MORE: CPT | Performed by: PODIATRIST

## 2024-10-23 PROCEDURE — 99999 PR OFFICE/OUTPT VISIT,PROCEDURE ONLY: CPT | Performed by: PODIATRIST

## 2024-10-23 PROCEDURE — 11055 PARING/CUTG B9 HYPRKER LES 1: CPT | Performed by: PODIATRIST

## 2024-10-23 NOTE — PROGRESS NOTES
absent bilateral.  Temperature decreased bilateral. Hyperpigmentation present bilateral. Atrophic skin yes.    Neurological: Sensation intact to light touch to level of digits, bilateral.  Protective sensation intact 10/10 sites via 5.07/10g Honaker-Roselyn Monofilament, bilateral.  negative Tinel's, bilateral.  negative Valleix sign, bilateral.  Vibratory intact bilateral.  Reflexes Decreased bilateral.  Paresthesias negative.  Dysthesias negative.  Sharp/dull intact bilateral.    Musculoskeletal: Muscle strength 5/5, Bilateral.  Pain present upon palpation of b/l hallux, Bilateral. within normal limits medial longitudinal arch, Bilateral.  Ankle ROM decreased,Bilateral.  1st MPJ ROM within normal limits, Bilateral.  Dorsally contracted digits absent digits none, Bilateral. Other foot deformities none.    Integument: Warm, dry, supple, Bilateral.  Open lesion absent, Bilateral.  Interdigital maceration absent to web spaces absent, Bilateral.  Nails left 1, 2, 5 and right 1, 2, 3, 4, 5 thickened, dystrophic and crumbly, discolored with subungual debris. OG b/l hallux.  Fissures absent, Bilateral. Hyperkeratotic tissue is present.  Seen plantar medial R hallux ipj    Asessment: Patient is a 89 y.o. female with:    Diagnosis Orders   1. Stage 3 chronic kidney disease, unspecified whether stage 3a or 3b CKD (HCC)        2. OG (onychogryphosis)        3. Dermatophytosis of nail        4. Corns and callosities              Plan: Patient examined and evaluated.  Current condition and treatment options discussed in detail.   All nails as mentioned above debrided in length and thickness.  Paring of 1 benign hyperkeratotic lesions (as listed above) took place with #10 blade or tissue nippers.  Patient advised OTC methods for treatment of the mycotic nails.  Patient will follow up in 10 weeks.   Contact office with any questions/problems/concerns.      no

## 2024-10-24 ENCOUNTER — TELEPHONE (OUTPATIENT)
Dept: FAMILY MEDICINE CLINIC | Age: 89
End: 2024-10-24

## 2024-10-24 NOTE — TELEPHONE ENCOUNTER
Pt  states pt needs script for handicap placard. Pt goes for therapy 4 times a week at the [facility] and it is hard for them to walk in from the regular parking spots.

## 2024-10-28 ENCOUNTER — OFFICE VISIT (OUTPATIENT)
Dept: FAMILY MEDICINE CLINIC | Age: 89
End: 2024-10-28
Payer: MEDICARE

## 2024-10-28 VITALS
OXYGEN SATURATION: 96 % | BODY MASS INDEX: 19.88 KG/M2 | HEART RATE: 52 BPM | DIASTOLIC BLOOD PRESSURE: 82 MMHG | HEIGHT: 62 IN | WEIGHT: 108 LBS | SYSTOLIC BLOOD PRESSURE: 122 MMHG

## 2024-10-28 DIAGNOSIS — I10 PRIMARY HYPERTENSION: ICD-10-CM

## 2024-10-28 DIAGNOSIS — R53.1 GENERALIZED WEAKNESS: Primary | ICD-10-CM

## 2024-10-28 DIAGNOSIS — F03.B18 MODERATE DEMENTIA WITH OTHER BEHAVIORAL DISTURBANCE, UNSPECIFIED DEMENTIA TYPE (HCC): ICD-10-CM

## 2024-10-28 DIAGNOSIS — M17.11 PRIMARY OSTEOARTHRITIS OF RIGHT KNEE: ICD-10-CM

## 2024-10-28 DIAGNOSIS — R26.89 BALANCE PROBLEM: ICD-10-CM

## 2024-10-28 PROCEDURE — 99211 OFF/OP EST MAY X REQ PHY/QHP: CPT | Performed by: FAMILY MEDICINE

## 2024-10-28 NOTE — PROGRESS NOTES
HPI:  Patient comes in today for   Chief Complaint   Patient presents with    Fatigue     Pt is here for weakness. Pt is going to PT but she feels like her bP has been dropping, she has almost fallen because of it.    Here for  f/u on generalized weakness and gait problems is getting PT is helping someuses a walker needs handicap parking.Ih/o HTN and Her BP has been fluctuant is on Atenolol and cozaar and uses clonidine as needed h/o Dementia is  on Aricept and celexa for  depression was on Rexulti for agitation but has quit taking taking it due to concerns for side effects has bben doing ok. .h/o  Hyperlipidemia is not on medications.h/o Macular degeneration both eyes follows with opthalmology vision is poor.Has prolems with right knee pain and leg pain.  HISTORY:  Past Medical History:   Diagnosis Date    Allergic rhinitis     Anxiety     Chronic kidney disease 10/18/2016    COPD (chronic obstructive pulmonary disease) ([facility]) 7/1/2019    Family history of colon cancer     Glaucoma     History of elevated lipids     History of fatigue     History of mitral valve prolapse     Hyperlipidemia     Hx of    Macular degeneration     Restless legs syndrome        Past Surgical History:   Procedure Laterality Date    BREAST BIOPSY Left     CARPAL TUNNEL RELEASE      COLONOSCOPY  09/08/09    -repeat 5 years    EYE SURGERY  2012    Dr Huston    HYSTERECTOMY (CERVIX STATUS UNKNOWN)      SINUS SURGERY      x2    TOE SURGERY          Family History   Problem Relation Age of Onset    Cancer Brother         colon       Social History     Socioeconomic History    Marital status: Unknown     Spouse name: Not on file    Number of children: Not on file    Years of education: Not on file    Highest education level: Not on file   Occupational History    Not on file   Tobacco Use    Smoking status: Never    Smokeless tobacco: Never   Substance and Sexual Activity    Alcohol use: No    Drug use: No    Sexual activity: Not on file

## 2024-12-05 DIAGNOSIS — I10 PRIMARY HYPERTENSION: ICD-10-CM

## 2024-12-05 RX ORDER — ATENOLOL 50 MG/1
50 TABLET ORAL 2 TIMES DAILY
Qty: 180 TABLET | Refills: 0 | Status: SHIPPED | OUTPATIENT
Start: 2024-12-05

## 2024-12-05 NOTE — TELEPHONE ENCOUNTER
Iram Monte is requesting a refill on the following medication(s):  Requested Prescriptions     Pending Prescriptions Disp Refills    atenolol (TENORMIN) 50 MG tablet [Pharmacy Med Name: Atenolol 50 MG Oral Tablet] 180 tablet 0     Sig: Take 1 tablet by mouth twice daily       Last Visit Date (If Applicable):  10/28/2024    Next Visit Date:    1/10/2025

## 2024-12-19 RX ORDER — CLONIDINE HYDROCHLORIDE 0.1 MG/1
0.1 TABLET ORAL DAILY PRN
Qty: 90 TABLET | Refills: 0 | Status: SHIPPED | OUTPATIENT
Start: 2024-12-19

## 2024-12-19 RX ORDER — LOSARTAN POTASSIUM 100 MG/1
100 TABLET ORAL DAILY
Qty: 90 TABLET | Refills: 1 | Status: SHIPPED | OUTPATIENT
Start: 2024-12-19

## 2024-12-19 NOTE — TELEPHONE ENCOUNTER
Iram Monte is requesting a refill on the following medication(s):  Requested Prescriptions     Pending Prescriptions Disp Refills    losartan (COZAAR) 100 MG tablet 90 tablet 1     Sig: Take 1 tablet by mouth daily    cloNIDine (CATAPRES) 0.1 MG tablet 90 tablet 0     Sig: Take 1 tablet by mouth daily as needed for High Blood Pressure (if SBP  is greater then 160)       Last Visit Date (If Applicable):  10/28/2024    Next Visit Date:    1/10/2025

## 2024-12-30 NOTE — TELEPHONE ENCOUNTER
Dr Mccrary is out for two weeks. Pt will be out of meds before he returns.      Iram Monte is requesting a refill on the following medication(s):  Requested Prescriptions     Pending Prescriptions Disp Refills    cloNIDine (CATAPRES) 0.1 MG tablet 90 tablet 0     Sig: Take 1 tablet by mouth daily as needed for High Blood Pressure (if SBP  is greater then 160)    losartan (COZAAR) 100 MG tablet 90 tablet 1     Sig: Take 1 tablet by mouth daily       Last Visit Date (If Applicable):  10/28/2024    Next Visit Date:    1/10/2025

## 2024-12-31 RX ORDER — CLONIDINE HYDROCHLORIDE 0.1 MG/1
0.1 TABLET ORAL DAILY PRN
Qty: 90 TABLET | Refills: 0 | Status: SHIPPED | OUTPATIENT
Start: 2024-12-31

## 2024-12-31 RX ORDER — LOSARTAN POTASSIUM 100 MG/1
100 TABLET ORAL DAILY
Qty: 90 TABLET | Refills: 0 | Status: SHIPPED | OUTPATIENT
Start: 2024-12-31

## 2025-01-10 ENCOUNTER — OFFICE VISIT (OUTPATIENT)
Dept: FAMILY MEDICINE CLINIC | Age: 89
End: 2025-01-10
Payer: MEDICARE

## 2025-01-10 VITALS
HEIGHT: 62 IN | HEART RATE: 54 BPM | BODY MASS INDEX: 20.06 KG/M2 | OXYGEN SATURATION: 98 % | WEIGHT: 109 LBS | SYSTOLIC BLOOD PRESSURE: 130 MMHG | DIASTOLIC BLOOD PRESSURE: 80 MMHG

## 2025-01-10 DIAGNOSIS — F03.B18 MODERATE DEMENTIA WITH OTHER BEHAVIORAL DISTURBANCE, UNSPECIFIED DEMENTIA TYPE (HCC): ICD-10-CM

## 2025-01-10 DIAGNOSIS — N18.31 STAGE 3A CHRONIC KIDNEY DISEASE (HCC): ICD-10-CM

## 2025-01-10 DIAGNOSIS — I10 PRIMARY HYPERTENSION: Primary | ICD-10-CM

## 2025-01-10 DIAGNOSIS — M17.11 PRIMARY OSTEOARTHRITIS OF RIGHT KNEE: ICD-10-CM

## 2025-01-10 DIAGNOSIS — H35.3230 BILATERAL EXUDATIVE AGE-RELATED MACULAR DEGENERATION, UNSPECIFIED STAGE (HCC): ICD-10-CM

## 2025-01-10 DIAGNOSIS — R26.89 BALANCE PROBLEM: ICD-10-CM

## 2025-01-10 PROBLEM — R53.1 WEAKNESS: Status: ACTIVE | Noted: 2022-11-26

## 2025-01-10 PROBLEM — R05.9 COUGH, UNSPECIFIED: Status: ACTIVE | Noted: 2022-11-26

## 2025-01-10 PROBLEM — N39.0 UTI (URINARY TRACT INFECTION): Status: ACTIVE | Noted: 2025-01-10

## 2025-01-10 PROBLEM — J18.9 COMMUNITY ACQUIRED PNEUMONIA: Status: ACTIVE | Noted: 2022-11-26

## 2025-01-10 PROBLEM — H43.813 POSTERIOR VITREOUS DETACHMENT OF BOTH EYES: Status: ACTIVE | Noted: 2019-11-12

## 2025-01-10 PROBLEM — R05.9 COUGH: Status: ACTIVE | Noted: 2025-01-10

## 2025-01-10 PROBLEM — J18.9 LEFT LOWER LOBE PNEUMONIA: Status: ACTIVE | Noted: 2025-01-10

## 2025-01-10 PROBLEM — R00.1 BRADYCARDIA: Status: ACTIVE | Noted: 2020-11-12

## 2025-01-10 PROCEDURE — 99211 OFF/OP EST MAY X REQ PHY/QHP: CPT | Performed by: FAMILY MEDICINE

## 2025-01-10 SDOH — ECONOMIC STABILITY: FOOD INSECURITY: WITHIN THE PAST 12 MONTHS, YOU WORRIED THAT YOUR FOOD WOULD RUN OUT BEFORE YOU GOT MONEY TO BUY MORE.: NEVER TRUE

## 2025-01-10 SDOH — ECONOMIC STABILITY: FOOD INSECURITY: WITHIN THE PAST 12 MONTHS, THE FOOD YOU BOUGHT JUST DIDN'T LAST AND YOU DIDN'T HAVE MONEY TO GET MORE.: NEVER TRUE

## 2025-01-10 ASSESSMENT — PATIENT HEALTH QUESTIONNAIRE - PHQ9: DEPRESSION UNABLE TO ASSESS: PT REFUSES

## 2025-01-10 NOTE — PROGRESS NOTES
(REXULTI) 0.5 MG TABS tablet Take 1 tablet by mouth daily 30 tablet 2    GENERIC EXTERNAL MEDICATION daily Patient reports taking Billberry daily      Ascorbic Acid (VITAMIN C) 100 MG tablet Take 1 tablet by mouth daily      Lutein 40 MG CAPS Take  by mouth. #1tab bid       No current facility-administered medications for this visit.        Allergies   Allergen Reactions    Beconase [Beclomethasone]     Benadryl [Diphenhydramine]     Bicillin C-R [Penicillin G Benzathine & Proc]     Chloramphenicols     Ephedrine     Niacin And Related     Other      Flu Vaccine  Pseudophedrine  Chlordrine  Chlorpeniramine    Penicillins     Phenergan [Promethazine Hcl]     Phenylpropanolamine     Propacetamol     Proventil [Albuterol]     Seldane [Terfenadine]     Timoptic [Timolol Maleate]     Triamcinolone        REVIEW OF SYSTEMS:  General: No fevers, chills, change in weight  HEENT: No double vision, has poor vision both eyes, no runny nose, sore throat or tinnitus  Cardio: No chest pain, palpitations, SUTTON, edema, PND  Pulmonary: Has cough, no hemoptysis,no  SOB  GI: No nausea, vomiting, dysphagia, odynophagia, diarrhea improved.  : No dysuria, hematuria, urgency, incontinence  Musculoskeletal:Right knee pain. No other muscle or joint aches, no joint swelling  Neuro: No dizziness/lightheadedness, no seizures  Endocrine: No polyuria, polydipsia, polyphagia, no temperature intolerance  Skin: No lesions or itching  No problems with ADLs  Sleep: Fair  Psychiatric: Has depression,has anxiety    PHYSICAL EXAM:  VS:  /80 (Site: Left Upper Arm, Position: Sitting, Cuff Size: Medium Adult)   Pulse 54   Ht 1.575 m (5' 2\")   Wt 49.4 kg (109 lb)   SpO2 98%   BMI 19.94 kg/m²   General:  Alert and oriented, NAD  HEENT:  TMs, YASEMIN, EOMI, Conjunctivae clear.Throat currently clear.  NECK:  Supple without adenopathy or thyromegaly, no carotid bruits  LUNGS:  CTA all fields  HEART:  RRR without M, R, or G  ABDOMEN:  Soft and nontender

## 2025-02-09 PROBLEM — R05.9 COUGH: Status: RESOLVED | Noted: 2025-01-10 | Resolved: 2025-02-09

## 2025-02-09 PROBLEM — N39.0 UTI (URINARY TRACT INFECTION): Status: RESOLVED | Noted: 2025-01-10 | Resolved: 2025-02-09

## 2025-02-19 NOTE — TELEPHONE ENCOUNTER
Iram Monte is requesting a refill on the following medication(s):  Requested Prescriptions     Pending Prescriptions Disp Refills    donepezil (ARICEPT) 5 MG tablet 90 tablet 1     Sig: Take 1 tablet by mouth nightly    citalopram (CELEXA) 20 MG tablet 90 tablet 1     Sig: Take 1 tablet by mouth daily       Last Visit Date (If Applicable):  1/10/2025    Next Visit Date:    4/11/2025

## 2025-02-20 RX ORDER — CITALOPRAM HYDROBROMIDE 20 MG/1
20 TABLET ORAL DAILY
Qty: 90 TABLET | Refills: 1 | Status: SHIPPED | OUTPATIENT
Start: 2025-02-20

## 2025-02-20 RX ORDER — DONEPEZIL HYDROCHLORIDE 5 MG/1
5 TABLET, FILM COATED ORAL NIGHTLY
Qty: 90 TABLET | Refills: 1 | Status: SHIPPED | OUTPATIENT
Start: 2025-02-20

## 2025-02-25 RX ORDER — DONEPEZIL HYDROCHLORIDE 5 MG/1
5 TABLET, FILM COATED ORAL NIGHTLY
Qty: 90 TABLET | Refills: 1 | Status: SHIPPED | OUTPATIENT
Start: 2025-02-25

## 2025-02-25 RX ORDER — CITALOPRAM HYDROBROMIDE 20 MG/1
20 TABLET ORAL DAILY
Qty: 90 TABLET | Refills: 1 | Status: SHIPPED | OUTPATIENT
Start: 2025-02-25

## 2025-02-25 NOTE — TELEPHONE ENCOUNTER
Iram Monte is requesting a refill on the following medication(s):  Requested Prescriptions     Pending Prescriptions Disp Refills    citalopram (CELEXA) 20 MG tablet 90 tablet 1     Sig: Take 1 tablet by mouth daily    donepezil (ARICEPT) 5 MG tablet 90 tablet 1     Sig: Take 1 tablet by mouth nightly       Last Visit Date (If Applicable):  1/10/2025    Next Visit Date:    4/11/2025

## 2025-02-26 ENCOUNTER — OFFICE VISIT (OUTPATIENT)
Dept: PODIATRY | Age: 89
End: 2025-02-26
Payer: MEDICARE

## 2025-02-26 VITALS
HEART RATE: 58 BPM | BODY MASS INDEX: 20.06 KG/M2 | WEIGHT: 109 LBS | DIASTOLIC BLOOD PRESSURE: 70 MMHG | SYSTOLIC BLOOD PRESSURE: 115 MMHG | HEIGHT: 62 IN

## 2025-02-26 DIAGNOSIS — L84 CORNS AND CALLOSITIES: ICD-10-CM

## 2025-02-26 DIAGNOSIS — L60.2 OG (ONYCHOGRYPHOSIS): ICD-10-CM

## 2025-02-26 DIAGNOSIS — B35.1 DERMATOPHYTOSIS OF NAIL: ICD-10-CM

## 2025-02-26 DIAGNOSIS — N18.30 STAGE 3 CHRONIC KIDNEY DISEASE, UNSPECIFIED WHETHER STAGE 3A OR 3B CKD (HCC): Primary | ICD-10-CM

## 2025-02-26 PROCEDURE — 11721 DEBRIDE NAIL 6 OR MORE: CPT | Performed by: PODIATRIST

## 2025-02-26 PROCEDURE — 11055 PARING/CUTG B9 HYPRKER LES 1: CPT | Performed by: PODIATRIST

## 2025-02-26 NOTE — PROGRESS NOTES
Foot Care Worksheet  PCP: Bubba Mccrary MD  Last visit: 1/10/25    Nail description: Thick , Yellow , Crumbly , Marked limitation of ambulation     Pain resulting from thickened and dystrophy of nail plate Yes    Nails involved  Right   1, 2, 3, 4, 5  (T5-T9)  Left     1,2  5  (TA-T4)    Q7 1 Class A Finding - Non traumatic amputation of foot No    Q8 2 Class B Findings - Absent DP pulse No, Absent PT pulse No, Advanced tropic changes (3 required) Yes    Decrease hair growth Yes, Nail changes/thickening Yes, Pigmented changes/discoloration Yes, Skin texture (thin, shiny) No, Skin color (rubor/redness) No    Q9 1 Class B and 2 Class C Findings  Claudication No, Temperature change Yes, Paresthesia No, Burning No, Edema Yes    Subjective:  Iram Monte is a 89 y.o. female who presents to the office today for foot care.   Currently denies F/C/N/V.     Allergies   Allergen Reactions    Beconase [Beclomethasone]     Benadryl [Diphenhydramine]     Bicillin C-R [Penicillin G Benzathine & Proc]     Chloramphenicols     Ephedrine     Niacin And Related     Other      Flu Vaccine  Pseudophedrine  Chlordrine  Chlorpeniramine    Penicillins     Phenergan [Promethazine Hcl]     Phenylpropanolamine     Propacetamol     Proventil [Albuterol]     Seldane [Terfenadine]     Timoptic [Timolol Maleate]     Triamcinolone        Past Medical History:   Diagnosis Date    Allergic rhinitis     Anxiety     Chronic kidney disease 10/18/2016    COPD (chronic obstructive pulmonary disease) ([facility]) 7/1/2019    Family history of colon cancer     Glaucoma     History of elevated lipids     History of fatigue     History of mitral valve prolapse     Hyperlipidemia     Hx of    Macular degeneration     Restless legs syndrome        Prior to Admission medications    Medication Sig Start Date End Date Taking? Authorizing Provider   citalopram (CELEXA) 20 MG tablet Take 1 tablet by mouth daily 2/25/25  Yes Bubba Mccrary MD

## 2025-02-28 DIAGNOSIS — I10 PRIMARY HYPERTENSION: ICD-10-CM

## 2025-03-03 RX ORDER — ATENOLOL 50 MG/1
50 TABLET ORAL 2 TIMES DAILY
Qty: 180 TABLET | Refills: 0 | Status: SHIPPED | OUTPATIENT
Start: 2025-03-03

## 2025-03-03 NOTE — TELEPHONE ENCOUNTER
Iram Monte is requesting a refill on the following medication(s):  Requested Prescriptions     Pending Prescriptions Disp Refills    atenolol (TENORMIN) 50 MG tablet [Pharmacy Med Name: Atenolol 50 MG Oral Tablet] 180 tablet 0     Sig: Take 1 tablet by mouth twice daily       Last Visit Date (If Applicable):  1/10/2025    Next Visit Date:    4/11/2025

## 2025-04-07 ENCOUNTER — TELEPHONE (OUTPATIENT)
Dept: FAMILY MEDICINE CLINIC | Age: 89
End: 2025-04-07

## 2025-04-07 DIAGNOSIS — R53.1 WEAKNESS: Primary | ICD-10-CM

## 2025-04-07 DIAGNOSIS — I10 ESSENTIAL HYPERTENSION, BENIGN: ICD-10-CM

## 2025-04-07 DIAGNOSIS — F03.B18 MODERATE DEMENTIA WITH OTHER BEHAVIORAL DISTURBANCE, UNSPECIFIED DEMENTIA TYPE (HCC): ICD-10-CM

## 2025-04-07 DIAGNOSIS — G30.9 ALZHEIMER'S DISEASE, UNSPECIFIED (CODE): ICD-10-CM

## 2025-04-07 DIAGNOSIS — I10 PRIMARY HYPERTENSION: ICD-10-CM

## 2025-04-07 DIAGNOSIS — N18.30 STAGE 3 CHRONIC KIDNEY DISEASE, UNSPECIFIED WHETHER STAGE 3A OR 3B CKD (HCC): ICD-10-CM

## 2025-04-07 DIAGNOSIS — H35.3290 EXUDATIVE AGE-RELATED MACULAR DEGENERATION, UNSPECIFIED LATERALITY, UNSPECIFIED STAGE (HCC): ICD-10-CM

## 2025-04-07 DIAGNOSIS — M19.90 ARTHRITIS: ICD-10-CM

## 2025-04-07 DIAGNOSIS — H40.1190 PRIMARY OPEN ANGLE GLAUCOMA, UNSPECIFIED GLAUCOMA STAGE, UNSPECIFIED LATERALITY: ICD-10-CM

## 2025-04-07 NOTE — TELEPHONE ENCOUNTER
Pts  called stating that pt has surgery on her ankle and needs help with bathing and dressing. I put a referral in for [facility] since pt has had them before.

## 2025-04-11 ENCOUNTER — TELEPHONE (OUTPATIENT)
Dept: FAMILY MEDICINE CLINIC | Age: 89
End: 2025-04-11

## 2025-04-11 NOTE — TELEPHONE ENCOUNTER
POC update:    Sophia will follow 1 time a week for the first week, then 2 times a week for three weeks, and then 1 time a week for 2 weeks.    They stated that initially patient had refused PT, but has since decided to go forward with it. They will let us know if an updated order is needed.

## 2025-04-14 ENCOUNTER — TELEPHONE (OUTPATIENT)
Dept: FAMILY MEDICINE CLINIC | Age: 89
End: 2025-04-14

## 2025-04-14 DIAGNOSIS — R53.1 GENERALIZED WEAKNESS: ICD-10-CM

## 2025-04-14 DIAGNOSIS — R26.89 BALANCE PROBLEM: ICD-10-CM

## 2025-04-14 DIAGNOSIS — Z91.81 HISTORY OF FALL: Primary | ICD-10-CM

## 2025-04-14 NOTE — TELEPHONE ENCOUNTER
PT from [facility]  called to ask for an order for a wheel chair and for that order to be sent too [facility]. Order was placed and faxed to aravind maciel.

## 2025-05-02 ENCOUNTER — OFFICE VISIT (OUTPATIENT)
Dept: FAMILY MEDICINE CLINIC | Age: 89
End: 2025-05-02
Payer: MEDICARE

## 2025-05-02 VITALS
HEART RATE: 60 BPM | SYSTOLIC BLOOD PRESSURE: 90 MMHG | OXYGEN SATURATION: 95 % | HEIGHT: 62 IN | DIASTOLIC BLOOD PRESSURE: 60 MMHG | BODY MASS INDEX: 18.95 KG/M2 | WEIGHT: 103 LBS

## 2025-05-02 DIAGNOSIS — S82.891D CLOSED FRACTURE OF RIGHT ANKLE WITH ROUTINE HEALING, SUBSEQUENT ENCOUNTER: ICD-10-CM

## 2025-05-02 DIAGNOSIS — F03.B18 MODERATE DEMENTIA WITH OTHER BEHAVIORAL DISTURBANCE, UNSPECIFIED DEMENTIA TYPE (HCC): ICD-10-CM

## 2025-05-02 DIAGNOSIS — I10 PRIMARY HYPERTENSION: ICD-10-CM

## 2025-05-02 DIAGNOSIS — M17.11 PRIMARY OSTEOARTHRITIS OF RIGHT KNEE: ICD-10-CM

## 2025-05-02 DIAGNOSIS — Z87.81 S/P ORIF (OPEN REDUCTION INTERNAL FIXATION) FRACTURE: ICD-10-CM

## 2025-05-02 DIAGNOSIS — Z98.890 S/P ORIF (OPEN REDUCTION INTERNAL FIXATION) FRACTURE: ICD-10-CM

## 2025-05-02 DIAGNOSIS — N18.31 STAGE 3A CHRONIC KIDNEY DISEASE (HCC): Primary | ICD-10-CM

## 2025-05-02 PROCEDURE — 99211 OFF/OP EST MAY X REQ PHY/QHP: CPT | Performed by: FAMILY MEDICINE

## 2025-05-02 RX ORDER — LOSARTAN POTASSIUM 100 MG/1
100 TABLET ORAL DAILY
Qty: 90 TABLET | Refills: 0 | Status: SHIPPED | OUTPATIENT
Start: 2025-05-02

## 2025-05-02 RX ORDER — DOCUSATE SODIUM 100 MG/1
CAPSULE, LIQUID FILLED ORAL
COMMUNITY
Start: 2025-04-04

## 2025-05-02 RX ORDER — ATENOLOL 50 MG/1
50 TABLET ORAL 2 TIMES DAILY
Qty: 180 TABLET | Refills: 0 | Status: SHIPPED | OUTPATIENT
Start: 2025-05-02

## 2025-05-02 RX ORDER — ASPIRIN 81 MG/1
81 TABLET, COATED ORAL DAILY
COMMUNITY
Start: 2025-04-04

## 2025-05-02 NOTE — PROGRESS NOTES
HPI:  Patient comes in today for   Chief Complaint   Patient presents with    Follow-up     Pt is here for a 3 month f/u. Pt states that she fell about 4 weeks ago and broke her right ankle. Pt does have a boot on. Pt states that her BP has been low at times   Here for  f/u .Had a right ankle lateral malleolar fracture  about 4 weeks ago after she had a fall at home was seen in the ER and has seen ortho and had ORIF and is on  a Boot  pain is improved.is receiving home PT/OT.h/o HTN and Her BP has been fluctuant is on Atenolol and cozaar and uses clonidine as needed h/o Dementia is  on Aricept and celexa for  depression was on Rexulti for agitation but has quit taking taking it due to concerns for side effects has been doing ok. .h/o  Hyperlipidemia is not on medications.h/o Macular degeneration both eyes follows with opthalmology vision is poor.Has prolems with right knee pain and leg pain.Her gait problems are improved with physical therapy no recent falls  HISTORY:  Past Medical History:   Diagnosis Date    Allergic rhinitis     Anxiety     Chronic kidney disease 10/18/2016    COPD (chronic obstructive pulmonary disease) ([facility]) 7/1/2019    Family history of colon cancer     Glaucoma     History of elevated lipids     History of fatigue     History of mitral valve prolapse     Hyperlipidemia     Hx of    Macular degeneration     Restless legs syndrome        Past Surgical History:   Procedure Laterality Date    BREAST BIOPSY Left     CARPAL TUNNEL RELEASE      COLONOSCOPY  09/08/09    -repeat 5 years    EYE SURGERY  2012    Dr Huston    HYSTERECTOMY (CERVIX STATUS UNKNOWN)      SINUS SURGERY      x2    TOE SURGERY          Family History   Problem Relation Age of Onset    Cancer Brother         colon       Social History     Socioeconomic History    Marital status: Unknown     Spouse name: Not on file    Number of children: Not on file    Years of education: Not on file    Highest education level: Not on

## 2025-05-12 LAB
ALBUMIN/GLOBULIN RATIO: 1.3 G/DL
ALBUMIN: 4.1 G/DL (ref 3.5–5)
ALP BLD-CCNC: 168 UNITS/L (ref 38–126)
ALT SERPL-CCNC: 68 UNITS/L (ref 4–35)
ANION GAP SERPL CALCULATED.3IONS-SCNC: 12.3 MMOL/L (ref 3–11)
AST SERPL-CCNC: 54 UNITS/L (ref 14–36)
BILIRUB SERPL-MCNC: 0.5 MG/DL (ref 0.2–1.3)
BUN BLDV-MCNC: 35 MG/DL (ref 7–17)
CALCIUM SERPL-MCNC: 9.9 MG/DL (ref 8.4–10.2)
CHLORIDE BLD-SCNC: 104 MMOL/L (ref 98–120)
CHOLESTEROL, TOTAL: 232 MG/DL (ref 50–200)
CHOLESTEROL/HDL RATIO: 3.68 RATIO (ref 0–4.5)
CO2: 27 MMOL/L (ref 22–31)
CREAT SERPL-MCNC: 1.5 MG/DL (ref 0.5–1)
GFR, ESTIMATED: 34.6
GLOBULIN: 3.1 G/DL
GLUCOSE: 106 MG/DL (ref 65–105)
HCT VFR BLD CALC: 39.7 % (ref 37–47)
HDLC SERPL-MCNC: 63 MG/DL (ref 36–68)
HEMOGLOBIN: 12.3 G/DL (ref 12–16)
LDL CHOLESTEROL: 143.2 MG/DL (ref 0–160)
MCH RBC QN AUTO: 30.8 PG (ref 28.5–32.5)
MCHC RBC AUTO-ENTMCNC: 30.9 G/DL (ref 32–37)
MCV RBC AUTO: 99.4 FL (ref 80–94)
PDW BLD-RTO: 12.8 % (ref 8.5–15.5)
PLATELET # BLD: 230.4 THOU/MM3 (ref 130–400)
POTASSIUM SERPL-SCNC: 5.3 MMOL/L (ref 3.6–5)
RBC # BLD: 3.99 M/UL (ref 4.2–5.4)
SODIUM BLD-SCNC: 138 MMOL/L (ref 135–145)
TOTAL PROTEIN: 7.2 G/DL (ref 6.3–8.2)
TRIGL SERPL-MCNC: 129 MG/DL (ref 10–250)
TSH SERPL DL<=0.05 MIU/L-ACNC: 3.02 MIU/ML (ref 0.49–4.67)
VLDLC SERPL CALC-MCNC: 26 MG/DL (ref 0–50)
WBC # BLD: 7 THOU/ML3 (ref 4.8–10.8)

## 2025-05-14 ENCOUNTER — RESULTS FOLLOW-UP (OUTPATIENT)
Dept: FAMILY MEDICINE CLINIC | Age: 89
End: 2025-05-14

## 2025-05-14 DIAGNOSIS — E87.5 HYPERKALEMIA: ICD-10-CM

## 2025-05-14 DIAGNOSIS — R74.8 ELEVATED LIVER ENZYMES: Primary | ICD-10-CM

## 2025-05-14 DIAGNOSIS — N18.32 STAGE 3B CHRONIC KIDNEY DISEASE (HCC): ICD-10-CM

## 2025-05-19 ENCOUNTER — TELEPHONE (OUTPATIENT)
Dept: FAMILY MEDICINE CLINIC | Age: 89
End: 2025-05-19

## 2025-05-19 NOTE — TELEPHONE ENCOUNTER
Pt is requesting to have a script for losartan 50mg to take a tablet twice a day. Pt does better taking the 100mg in two doses. Pt  also states that the 100mg tablets are difficult to cut in half so that would not be an option.

## 2025-05-20 RX ORDER — LOSARTAN POTASSIUM 50 MG/1
100 TABLET ORAL DAILY
Qty: 60 TABLET | Refills: 2 | Status: SHIPPED | OUTPATIENT
Start: 2025-05-20

## 2025-05-28 ENCOUNTER — OFFICE VISIT (OUTPATIENT)
Dept: PODIATRY | Age: 89
End: 2025-05-28
Payer: MEDICARE

## 2025-05-28 VITALS
DIASTOLIC BLOOD PRESSURE: 68 MMHG | HEIGHT: 62 IN | SYSTOLIC BLOOD PRESSURE: 92 MMHG | HEART RATE: 61 BPM | WEIGHT: 103 LBS | BODY MASS INDEX: 18.95 KG/M2

## 2025-05-28 DIAGNOSIS — L60.2 OG (ONYCHOGRYPHOSIS): ICD-10-CM

## 2025-05-28 DIAGNOSIS — B35.1 DERMATOPHYTOSIS OF NAIL: ICD-10-CM

## 2025-05-28 DIAGNOSIS — N18.30 STAGE 3 CHRONIC KIDNEY DISEASE, UNSPECIFIED WHETHER STAGE 3A OR 3B CKD (HCC): Primary | ICD-10-CM

## 2025-05-28 PROCEDURE — 11721 DEBRIDE NAIL 6 OR MORE: CPT | Performed by: PODIATRIST

## 2025-05-28 NOTE — PROGRESS NOTES
Foot Care Worksheet  PCP: Bubba Mccrary MD  Last visit: 5/2/25    Nail description: Thick , Yellow , Crumbly , Marked limitation of ambulation     Pain resulting from thickened and dystrophy of nail plate Yes    Nails involved  Right   1, 2, 3, 4, 5  (T5-T9)  Left     1,2  5  (TA-T4)    Q7 1 Class A Finding - Non traumatic amputation of foot No    Q8 2 Class B Findings - Absent DP pulse No, Absent PT pulse No, Advanced tropic changes (3 required) Yes    Decrease hair growth Yes, Nail changes/thickening Yes, Pigmented changes/discoloration Yes, Skin texture (thin, shiny) No, Skin color (rubor/redness) No    Q9 1 Class B and 2 Class C Findings  Claudication No, Temperature change Yes, Paresthesia No, Burning No, Edema Yes    Subjective:  Iram Monte is a 89 y.o. female who presents to the office today for foot care.   Currently denies F/C/N/V.     Allergies   Allergen Reactions    Beconase [Beclomethasone]     Benadryl [Diphenhydramine]     Bicillin C-R [Penicillin G Benzathine & Proc]     Chloramphenicols     Ephedrine     Niacin And Related     Other/Food      Flu Vaccine  Pseudophedrine  Chlordrine  Chlorpeniramine    Penicillins     Phenergan [Promethazine Hcl]     Phenylpropanolamine     Propacetamol     Proventil [Albuterol]     Seldane [Terfenadine]     Timoptic [Timolol Maleate]     Triamcinolone        Past Medical History:   Diagnosis Date    Allergic rhinitis     Anxiety     Chronic kidney disease 10/18/2016    COPD (chronic obstructive pulmonary disease) ([facility]) 7/1/2019    Family history of colon cancer     Glaucoma     History of elevated lipids     History of fatigue     History of mitral valve prolapse     Hyperlipidemia     Hx of    Macular degeneration     Restless legs syndrome        Prior to Admission medications    Medication Sig Start Date End Date Taking? Authorizing Provider   losartan (COZAAR) 50 MG tablet Take 2 tablets by mouth daily 5/20/25  Yes Bubba Mccrary MD

## 2025-05-29 DIAGNOSIS — I10 PRIMARY HYPERTENSION: ICD-10-CM

## 2025-05-29 RX ORDER — ATENOLOL 50 MG/1
50 TABLET ORAL 2 TIMES DAILY
Qty: 180 TABLET | Refills: 0 | Status: SHIPPED | OUTPATIENT
Start: 2025-05-29

## 2025-05-29 NOTE — TELEPHONE ENCOUNTER
Iram Monte is requesting a refill on the following medication(s):  Requested Prescriptions     Pending Prescriptions Disp Refills    atenolol (TENORMIN) 50 MG tablet [Pharmacy Med Name: Atenolol 50 MG Oral Tablet] 180 tablet 0     Sig: Take 1 tablet by mouth twice daily       Last Visit Date (If Applicable):  5/2/2025    Next Visit Date:    8/4/2025

## 2025-06-04 ENCOUNTER — OFFICE VISIT (OUTPATIENT)
Dept: FAMILY MEDICINE CLINIC | Age: 89
End: 2025-06-04
Payer: MEDICARE

## 2025-06-04 VITALS
HEIGHT: 62 IN | SYSTOLIC BLOOD PRESSURE: 128 MMHG | OXYGEN SATURATION: 100 % | WEIGHT: 103 LBS | BODY MASS INDEX: 18.95 KG/M2 | DIASTOLIC BLOOD PRESSURE: 80 MMHG | HEART RATE: 65 BPM

## 2025-06-04 DIAGNOSIS — N18.31 STAGE 3A CHRONIC KIDNEY DISEASE (HCC): ICD-10-CM

## 2025-06-04 DIAGNOSIS — R42 DIZZINESS: ICD-10-CM

## 2025-06-04 DIAGNOSIS — Z87.81 S/P ORIF (OPEN REDUCTION INTERNAL FIXATION) FRACTURE: ICD-10-CM

## 2025-06-04 DIAGNOSIS — F03.B18 MODERATE DEMENTIA WITH OTHER BEHAVIORAL DISTURBANCE, UNSPECIFIED DEMENTIA TYPE (HCC): ICD-10-CM

## 2025-06-04 DIAGNOSIS — R74.8 ELEVATED LIVER ENZYMES: ICD-10-CM

## 2025-06-04 DIAGNOSIS — Z98.890 S/P ORIF (OPEN REDUCTION INTERNAL FIXATION) FRACTURE: ICD-10-CM

## 2025-06-04 DIAGNOSIS — I10 PRIMARY HYPERTENSION: ICD-10-CM

## 2025-06-04 PROCEDURE — 99211 OFF/OP EST MAY X REQ PHY/QHP: CPT | Performed by: FAMILY MEDICINE

## 2025-06-04 RX ORDER — MECLIZINE HCL 12.5 MG 12.5 MG/1
12.5 TABLET ORAL 3 TIMES DAILY PRN
Qty: 30 TABLET | Refills: 1 | Status: SHIPPED | OUTPATIENT
Start: 2025-06-04

## 2025-06-04 NOTE — PROGRESS NOTES
docusate sodium (COLACE) 100 MG capsule TAKE 1 CAPSULE BY MOUTH TWICE DAILY AS NEEDED      citalopram (CELEXA) 20 MG tablet Take 1 tablet by mouth daily 90 tablet 1    donepezil (ARICEPT) 5 MG tablet Take 1 tablet by mouth nightly 90 tablet 1    cloNIDine (CATAPRES) 0.1 MG tablet Take 1 tablet by mouth daily as needed for High Blood Pressure (if SBP  is greater then 160) 90 tablet 0    Handicap Placard MISC by Does not apply route Permanent 1 each 0    sodium bicarbonate 650 MG tablet Take 1 tablet by mouth 2 times daily      brexpiprazole (REXULTI) 0.5 MG TABS tablet Take 1 tablet by mouth daily 30 tablet 2    GENERIC EXTERNAL MEDICATION daily Patient reports taking Billberry daily      Lutein 40 MG CAPS Take  by mouth. #1tab bid      Ascorbic Acid (VITAMIN C) 100 MG tablet Take 1 tablet by mouth daily       No current facility-administered medications for this visit.        Allergies   Allergen Reactions    Beconase [Beclomethasone]     Benadryl [Diphenhydramine]     Bicillin C-R [Penicillin G Benzathine & Proc]     Chloramphenicols     Ephedrine     Niacin And Related     Other/Food      Flu Vaccine  Pseudophedrine  Chlordrine  Chlorpeniramine    Penicillins     Phenergan [Promethazine Hcl]     Phenylpropanolamine     Propacetamol     Proventil [Albuterol]     Seldane [Terfenadine]     Timoptic [Timolol Maleate]     Triamcinolone        REVIEW OF SYSTEMS:  General: No fevers, chills, change in weight  HEENT: No double vision, has poor vision both eyes, no runny nose, sore throat or tinnitus  Cardio: No chest pain, palpitations, SUTTON, edema, PND  Pulmonary: Has cough, no hemoptysis,no  SOB  GI: No nausea, vomiting, dysphagia, odynophagia, diarrhea improved.  : No dysuria, hematuria, urgency, incontinence  Musculoskeletal:Right ankle  pain better. No other muscle or joint aches, no joint swelling  Neuro: Has dizziness on and off, no seizures  Endocrine: No polyuria, polydipsia, polyphagia, no temperature

## 2025-06-24 RX ORDER — MECLIZINE HCL 12.5 MG 12.5 MG/1
12.5 TABLET ORAL 3 TIMES DAILY PRN
Qty: 30 TABLET | Refills: 1 | Status: SHIPPED | OUTPATIENT
Start: 2025-06-24

## 2025-06-24 NOTE — TELEPHONE ENCOUNTER
Iram Monte is requesting a refill on the following medication(s):  Requested Prescriptions     Pending Prescriptions Disp Refills    meclizine (ANTIVERT) 12.5 MG tablet 30 tablet 1     Sig: Take 1 tablet by mouth 3 times daily as needed for Dizziness       Last Visit Date (If Applicable):  6/4/2025    Next Visit Date:    8/4/2025

## 2025-07-03 ENCOUNTER — HOSPITAL ENCOUNTER (OUTPATIENT)
Age: 89
Setting detail: SPECIMEN
Discharge: HOME | End: 2025-07-03
Payer: MEDICARE

## 2025-07-03 DIAGNOSIS — I10 PRIMARY HYPERTENSION: ICD-10-CM

## 2025-07-03 LAB
ALBUMIN SERPL-MCNC: 4 G/DL (ref 3.5–5.2)
ALBUMIN/GLOB SERPL: 1.5 {RATIO} (ref 1–2.5)
ALP SERPL-CCNC: 89 U/L (ref 35–104)
ALT SERPL-CCNC: 7 U/L (ref 10–35)
ANION GAP SERPL CALCULATED.3IONS-SCNC: 9 MMOL/L (ref 9–16)
AST SERPL-CCNC: 22 U/L (ref 10–35)
BILIRUB SERPL-MCNC: 0.4 MG/DL (ref 0–1.2)
BUN SERPL-MCNC: 25 MG/DL (ref 8–23)
BUN/CREAT SERPL: 18 (ref 9–20)
CALCIUM SERPL-MCNC: 9.6 MG/DL (ref 8.6–10.4)
CHLORIDE SERPL-SCNC: 105 MMOL/L (ref 98–107)
CHOLEST SERPL-MCNC: 226 MG/DL (ref 0–199)
CHOLESTEROL/HDL RATIO: 3.8
CO2 SERPL-SCNC: 26 MMOL/L (ref 20–31)
CREAT SERPL-MCNC: 1.4 MG/DL (ref 0.6–0.9)
ERYTHROCYTE [DISTWIDTH] IN BLOOD BY AUTOMATED COUNT: 12.9 % (ref 11.8–14.4)
GFR, ESTIMATED: 36 ML/MIN/1.73M2
GLUCOSE SERPL-MCNC: 95 MG/DL (ref 74–99)
HCT VFR BLD AUTO: 35.8 % (ref 36.3–47.1)
HDLC SERPL-MCNC: 59 MG/DL
HGB BLD-MCNC: 11.2 G/DL (ref 11.9–15.1)
LDLC SERPL CALC-MCNC: 144 MG/DL (ref 0–100)
MCH RBC QN AUTO: 30.8 PG (ref 25.2–33.5)
MCHC RBC AUTO-ENTMCNC: 31.3 G/DL (ref 25.2–33.5)
MCV RBC AUTO: 98.4 FL (ref 82.6–102.9)
NRBC BLD-RTO: 0 PER 100 WBC
PLATELET # BLD AUTO: 209 K/UL (ref 138–453)
PMV BLD AUTO: 10.4 FL (ref 8.1–13.5)
POTASSIUM SERPL-SCNC: 5 MMOL/L (ref 3.7–5.3)
PROT SERPL-MCNC: 6.7 G/DL (ref 6.6–8.7)
RBC # BLD AUTO: 3.64 M/UL (ref 3.95–5.11)
SODIUM SERPL-SCNC: 140 MMOL/L (ref 136–145)
TRIGL SERPL-MCNC: 116 MG/DL
TSH SERPL DL<=0.05 MIU/L-ACNC: 3.45 UIU/ML (ref 0.27–4.2)
VLDLC SERPL CALC-MCNC: 23 MG/DL (ref 1–30)
WBC OTHER # BLD: 5.9 K/UL (ref 3.5–11.3)

## 2025-07-03 PROCEDURE — 80053 COMPREHEN METABOLIC PANEL: CPT

## 2025-07-03 PROCEDURE — 84443 ASSAY THYROID STIM HORMONE: CPT

## 2025-07-03 PROCEDURE — 80061 LIPID PANEL: CPT

## 2025-07-03 PROCEDURE — 85027 COMPLETE CBC AUTOMATED: CPT

## 2025-07-23 ENCOUNTER — OFFICE VISIT (OUTPATIENT)
Dept: FAMILY MEDICINE CLINIC | Age: 89
End: 2025-07-23
Payer: MEDICARE

## 2025-07-23 VITALS
BODY MASS INDEX: 19.84 KG/M2 | DIASTOLIC BLOOD PRESSURE: 82 MMHG | OXYGEN SATURATION: 96 % | HEIGHT: 62 IN | HEART RATE: 53 BPM | SYSTOLIC BLOOD PRESSURE: 172 MMHG | WEIGHT: 107.8 LBS

## 2025-07-23 DIAGNOSIS — F03.B18 MODERATE DEMENTIA WITH OTHER BEHAVIORAL DISTURBANCE, UNSPECIFIED DEMENTIA TYPE (HCC): ICD-10-CM

## 2025-07-23 DIAGNOSIS — N18.31 STAGE 3A CHRONIC KIDNEY DISEASE (HCC): ICD-10-CM

## 2025-07-23 DIAGNOSIS — R42 DIZZINESS: ICD-10-CM

## 2025-07-23 DIAGNOSIS — I10 PRIMARY HYPERTENSION: Primary | ICD-10-CM

## 2025-07-23 PROCEDURE — 99211 OFF/OP EST MAY X REQ PHY/QHP: CPT | Performed by: FAMILY MEDICINE

## 2025-07-23 RX ORDER — HYDRALAZINE HYDROCHLORIDE 10 MG/1
10 TABLET, FILM COATED ORAL 3 TIMES DAILY
Qty: 90 TABLET | Refills: 3 | Status: SHIPPED | OUTPATIENT
Start: 2025-07-23 | End: 2025-11-20

## 2025-07-23 ASSESSMENT — PATIENT HEALTH QUESTIONNAIRE - PHQ9
2. FEELING DOWN, DEPRESSED OR HOPELESS: NOT AT ALL
9. THOUGHTS THAT YOU WOULD BE BETTER OFF DEAD, OR OF HURTING YOURSELF: NOT AT ALL
SUM OF ALL RESPONSES TO PHQ QUESTIONS 1-9: 0
6. FEELING BAD ABOUT YOURSELF - OR THAT YOU ARE A FAILURE OR HAVE LET YOURSELF OR YOUR FAMILY DOWN: NOT AT ALL
5. POOR APPETITE OR OVEREATING: NOT AT ALL
4. FEELING TIRED OR HAVING LITTLE ENERGY: NOT AT ALL
1. LITTLE INTEREST OR PLEASURE IN DOING THINGS: NOT AT ALL
SUM OF ALL RESPONSES TO PHQ QUESTIONS 1-9: 0
SUM OF ALL RESPONSES TO PHQ QUESTIONS 1-9: 0
7. TROUBLE CONCENTRATING ON THINGS, SUCH AS READING THE NEWSPAPER OR WATCHING TELEVISION: NOT AT ALL
3. TROUBLE FALLING OR STAYING ASLEEP: NOT AT ALL
8. MOVING OR SPEAKING SO SLOWLY THAT OTHER PEOPLE COULD HAVE NOTICED. OR THE OPPOSITE, BEING SO FIGETY OR RESTLESS THAT YOU HAVE BEEN MOVING AROUND A LOT MORE THAN USUAL: NOT AT ALL
10. IF YOU CHECKED OFF ANY PROBLEMS, HOW DIFFICULT HAVE THESE PROBLEMS MADE IT FOR YOU TO DO YOUR WORK, TAKE CARE OF THINGS AT HOME, OR GET ALONG WITH OTHER PEOPLE: NOT DIFFICULT AT ALL
SUM OF ALL RESPONSES TO PHQ QUESTIONS 1-9: 0

## 2025-07-23 NOTE — PROGRESS NOTES
HPI:  Patient comes in today for   Chief Complaint   Patient presents with    Hypertension     Pt is here for hypertension. Pt states her BP has been high in the morning and night. Pt states she has tried taking 2 BP meds but it is still high   Here to f/u on HTN her BP has been fluctuant at home has been taking her cozaar twice daily also on atenolol.Had problems taking clonidine caused falls.h/o Dizziness on and off is better, ,no nausea or emesis,no visual changes,no chest pain or palpitations .h/o Dementia is  on Aricept and celexa for  depression was on Rexulti for agitation but has quit taking taking it due to concerns for side effects has been doing ok. .h/o  Hyperlipidemia is not on medications.h/o Macular degeneration both eyes follows with opthalmology vision is poor.Has problems with right knee pain and leg pain.Her gait problems are improved with physical therapy no recent falls  HISTORY:  Past Medical History:   Diagnosis Date    Allergic rhinitis     Anxiety     Chronic kidney disease 10/18/2016    COPD (chronic obstructive pulmonary disease) ([facility]) 7/1/2019    Family history of colon cancer     Glaucoma     History of elevated lipids     History of fatigue     History of mitral valve prolapse     Hyperlipidemia     Hx of    Macular degeneration     Restless legs syndrome        Past Surgical History:   Procedure Laterality Date    BREAST BIOPSY Left     CARPAL TUNNEL RELEASE      COLONOSCOPY  09/08/09    -repeat 5 years    EYE SURGERY  2012    Dr Huston    HYSTERECTOMY (CERVIX STATUS UNKNOWN)      SINUS SURGERY      x2    TOE SURGERY          Family History   Problem Relation Age of Onset    Cancer Brother         colon       Social History     Socioeconomic History    Marital status: Unknown     Spouse name: Not on file    Number of children: Not on file    Years of education: Not on file    Highest education level: Not on file   Occupational History    Not on file   Tobacco Use    Smoking

## 2025-08-22 ENCOUNTER — APPOINTMENT (OUTPATIENT)
Dept: FAMILY MEDICINE CLINIC | Age: 89
End: 2025-08-22
Payer: MEDICARE

## 2025-08-22 ENCOUNTER — OFFICE VISIT (OUTPATIENT)
Dept: FAMILY MEDICINE CLINIC | Age: 89
End: 2025-08-22
Payer: MEDICARE

## 2025-08-22 ENCOUNTER — HOSPITAL ENCOUNTER (OUTPATIENT)
Dept: LAB | Age: 89
Setting detail: SPECIMEN
Discharge: HOME | End: 2025-08-22
Payer: MEDICARE

## 2025-08-22 VITALS
HEIGHT: 62 IN | HEART RATE: 60 BPM | SYSTOLIC BLOOD PRESSURE: 100 MMHG | DIASTOLIC BLOOD PRESSURE: 70 MMHG | WEIGHT: 101 LBS | BODY MASS INDEX: 18.58 KG/M2 | OXYGEN SATURATION: 95 %

## 2025-08-22 DIAGNOSIS — F03.B18 MODERATE DEMENTIA WITH OTHER BEHAVIORAL DISTURBANCE, UNSPECIFIED DEMENTIA TYPE (HCC): ICD-10-CM

## 2025-08-22 DIAGNOSIS — R53.1 GENERALIZED WEAKNESS: ICD-10-CM

## 2025-08-22 DIAGNOSIS — R53.1 GENERALIZED WEAKNESS: Primary | ICD-10-CM

## 2025-08-22 DIAGNOSIS — I10 PRIMARY HYPERTENSION: ICD-10-CM

## 2025-08-22 LAB
ANION GAP SERPL CALCULATED.3IONS-SCNC: 9 MMOL/L (ref 9–16)
BASOPHILS # BLD: 0.04 K/UL (ref 0–0.2)
BASOPHILS NFR BLD: 1 % (ref 0–2)
BUN SERPL-MCNC: 45 MG/DL (ref 8–23)
BUN/CREAT SERPL: 25 (ref 9–20)
CALCIUM SERPL-MCNC: 10 MG/DL (ref 8.6–10.4)
CHLORIDE SERPL-SCNC: 105 MMOL/L (ref 98–107)
CO2 SERPL-SCNC: 23 MMOL/L (ref 20–31)
CREAT SERPL-MCNC: 1.8 MG/DL (ref 0.6–0.9)
EOSINOPHIL # BLD: 0.16 K/UL (ref 0–0.44)
EOSINOPHILS RELATIVE PERCENT: 2 % (ref 1–4)
ERYTHROCYTE [DISTWIDTH] IN BLOOD BY AUTOMATED COUNT: 13.2 % (ref 11.8–14.4)
GFR, ESTIMATED: 26 ML/MIN/1.73M2
GLUCOSE SERPL-MCNC: 97 MG/DL (ref 74–99)
HCT VFR BLD AUTO: 37.2 % (ref 36.3–47.1)
HGB BLD-MCNC: 11.6 G/DL (ref 11.9–15.1)
IMM GRANULOCYTES # BLD AUTO: <0.03 K/UL (ref 0–0.3)
IMM GRANULOCYTES NFR BLD: 0 %
LYMPHOCYTES NFR BLD: 2.44 K/UL (ref 1.1–3.7)
LYMPHOCYTES RELATIVE PERCENT: 35 % (ref 24–43)
MAGNESIUM SERPL-MCNC: 2.5 MG/DL (ref 1.7–2.3)
MCH RBC QN AUTO: 31.2 PG (ref 25.2–33.5)
MCHC RBC AUTO-ENTMCNC: 31.2 G/DL (ref 25.2–33.5)
MCV RBC AUTO: 100 FL (ref 82.6–102.9)
MONOCYTES NFR BLD: 0.64 K/UL (ref 0.1–1.2)
MONOCYTES NFR BLD: 9 % (ref 3–12)
NEUTROPHILS NFR BLD: 53 % (ref 36–65)
NEUTS SEG NFR BLD: 3.68 K/UL (ref 1.5–8.1)
NRBC BLD-RTO: 0 PER 100 WBC
PLATELET # BLD AUTO: 207 K/UL (ref 138–453)
PMV BLD AUTO: 11.2 FL (ref 8.1–13.5)
POTASSIUM SERPL-SCNC: 5.6 MMOL/L (ref 3.7–5.3)
RBC # BLD AUTO: 3.72 M/UL (ref 3.95–5.11)
SODIUM SERPL-SCNC: 137 MMOL/L (ref 136–145)
WBC OTHER # BLD: 7 K/UL (ref 3.5–11.3)

## 2025-08-22 PROCEDURE — 85025 COMPLETE CBC W/AUTO DIFF WBC: CPT

## 2025-08-22 PROCEDURE — 80048 BASIC METABOLIC PNL TOTAL CA: CPT

## 2025-08-22 PROCEDURE — 99211 OFF/OP EST MAY X REQ PHY/QHP: CPT | Performed by: FAMILY MEDICINE

## 2025-08-22 PROCEDURE — 83735 ASSAY OF MAGNESIUM: CPT
